# Patient Record
Sex: FEMALE | Race: WHITE
[De-identification: names, ages, dates, MRNs, and addresses within clinical notes are randomized per-mention and may not be internally consistent; named-entity substitution may affect disease eponyms.]

---

## 2017-06-22 ENCOUNTER — HOSPITAL ENCOUNTER (OUTPATIENT)
Dept: HOSPITAL 76 - LAB | Age: 57
Discharge: HOME | End: 2017-06-22
Attending: PHYSICIAN ASSISTANT
Payer: COMMERCIAL

## 2017-06-22 DIAGNOSIS — R73.01: ICD-10-CM

## 2017-06-22 DIAGNOSIS — E78.5: Primary | ICD-10-CM

## 2017-06-22 LAB
ALBUMIN/GLOB SERPL: 1.6 {RATIO} (ref 1–2.2)
ANION GAP SERPL CALCULATED.4IONS-SCNC: 11 MMOL/L (ref 6–13)
BILIRUB BLD-MCNC: 1.5 MG/DL (ref 0.2–1)
BUN SERPL-MCNC: 25 MG/DL (ref 6–20)
CALCIUM UR-MCNC: 10.1 MG/DL (ref 8.5–10.3)
CHLORIDE SERPL-SCNC: 102 MMOL/L (ref 101–111)
CHOLEST SERPL-MCNC: 207 MG/DL
CO2 SERPL-SCNC: 27 MMOL/L (ref 21–32)
CREAT SERPLBLD-SCNC: 0.9 MG/DL (ref 0.4–1)
EST. AVERAGE GLUCOSE BLD GHB EST-MCNC: 157 MG/DL (ref 70–100)
GFRSERPLBLD MDRD-ARVRAT: 65 ML/MIN/{1.73_M2} (ref 89–?)
GLOBULIN SER-MCNC: 2.9 G/DL (ref 2.1–4.2)
GLUCOSE SERPL-MCNC: 147 MG/DL (ref 70–100)
HBA1C BLD-MCNC: 0.8 G/DL
HDLC SERPL-MCNC: 63 MG/DL
HDLC SERPL: 3.3 {RATIO} (ref ?–4.4)
LDLC/HDLC SERPL: 1.7 {RATIO} (ref ?–4.4)
POTASSIUM SERPL-SCNC: 4.1 MMOL/L (ref 3.5–5)
PROT SPEC-MCNC: 7.6 G/DL (ref 6.7–8.2)
SODIUM SERPLBLD-SCNC: 140 MMOL/L (ref 135–145)
TRIGL P FAST SERPL-MCNC: 180 MG/DL
VLDLC SERPL-SCNC: 36 MG/DL

## 2017-06-22 PROCEDURE — 36415 COLL VENOUS BLD VENIPUNCTURE: CPT

## 2017-06-22 PROCEDURE — 83036 HEMOGLOBIN GLYCOSYLATED A1C: CPT

## 2017-06-22 PROCEDURE — 80053 COMPREHEN METABOLIC PANEL: CPT

## 2017-06-22 PROCEDURE — 80061 LIPID PANEL: CPT

## 2017-08-04 ENCOUNTER — HOSPITAL ENCOUNTER (OUTPATIENT)
Dept: HOSPITAL 76 - DI | Age: 57
Discharge: HOME | End: 2017-08-04
Attending: PHYSICIAN ASSISTANT
Payer: COMMERCIAL

## 2017-08-04 DIAGNOSIS — R01.1: Primary | ICD-10-CM

## 2017-08-04 PROCEDURE — 93306 TTE W/DOPPLER COMPLETE: CPT

## 2017-10-23 ENCOUNTER — HOSPITAL ENCOUNTER (OUTPATIENT)
Dept: HOSPITAL 76 - LAB | Age: 57
Discharge: HOME | End: 2017-10-23
Attending: PHYSICIAN ASSISTANT
Payer: COMMERCIAL

## 2017-10-23 DIAGNOSIS — E11.9: Primary | ICD-10-CM

## 2017-10-23 LAB
ALBUMIN/GLOB SERPL: 1.5 {RATIO} (ref 1–2.2)
ANION GAP SERPL CALCULATED.4IONS-SCNC: 13 MMOL/L (ref 6–13)
BILIRUB BLD-MCNC: 1.4 MG/DL (ref 0.2–1)
BUN SERPL-MCNC: 28 MG/DL (ref 6–20)
CALCIUM UR-MCNC: 10.4 MG/DL (ref 8.5–10.3)
CHLORIDE SERPL-SCNC: 98 MMOL/L (ref 101–111)
CHOLEST SERPL-MCNC: 273 MG/DL
CO2 SERPL-SCNC: 26 MMOL/L (ref 21–32)
CREAT SERPLBLD-SCNC: 1.1 MG/DL (ref 0.4–1)
EST. AVERAGE GLUCOSE BLD GHB EST-MCNC: 143 MG/DL (ref 70–100)
GFRSERPLBLD MDRD-ARVRAT: 51 ML/MIN/{1.73_M2} (ref 89–?)
GLOBULIN SER-MCNC: 3.1 G/DL (ref 2.1–4.2)
GLUCOSE SERPL-MCNC: 133 MG/DL (ref 70–100)
HBA1C BLD-MCNC: 0.69 G/DL
HDLC SERPL-MCNC: 72 MG/DL
HDLC SERPL: 3.8 {RATIO} (ref ?–4.4)
LDLC/HDLC SERPL: 2.4 {RATIO} (ref ?–4.4)
POTASSIUM SERPL-SCNC: 4 MMOL/L (ref 3.5–5)
PROT SPEC-MCNC: 7.9 G/DL (ref 6.7–8.2)
SODIUM SERPLBLD-SCNC: 137 MMOL/L (ref 135–145)
TRIGL P FAST SERPL-MCNC: 157 MG/DL
VLDLC SERPL-SCNC: 31 MG/DL

## 2017-10-23 PROCEDURE — 83036 HEMOGLOBIN GLYCOSYLATED A1C: CPT

## 2017-10-23 PROCEDURE — 36415 COLL VENOUS BLD VENIPUNCTURE: CPT

## 2017-10-23 PROCEDURE — 80053 COMPREHEN METABOLIC PANEL: CPT

## 2017-10-23 PROCEDURE — 80061 LIPID PANEL: CPT

## 2018-01-17 ENCOUNTER — HOSPITAL ENCOUNTER (OUTPATIENT)
Dept: HOSPITAL 76 - LAB | Age: 58
Discharge: HOME | End: 2018-01-17
Attending: PHYSICIAN ASSISTANT
Payer: COMMERCIAL

## 2018-01-17 DIAGNOSIS — E11.9: Primary | ICD-10-CM

## 2018-01-17 LAB
ALBUMIN DIAFP-MCNC: 4.7 G/DL (ref 3.2–5.5)
ALBUMIN/GLOB SERPL: 1.6 {RATIO} (ref 1–2.2)
ALP SERPL-CCNC: 50 IU/L (ref 42–121)
ALT SERPL W P-5'-P-CCNC: 17 IU/L (ref 10–60)
ANION GAP SERPL CALCULATED.4IONS-SCNC: 12 MMOL/L (ref 6–13)
AST SERPL W P-5'-P-CCNC: 25 IU/L (ref 10–42)
BILIRUB BLD-MCNC: 1.6 MG/DL (ref 0.2–1)
BUN SERPL-MCNC: 24 MG/DL (ref 6–20)
CALCIUM UR-MCNC: 10.1 MG/DL (ref 8.5–10.3)
CHLORIDE SERPL-SCNC: 100 MMOL/L (ref 101–111)
CHOLEST SERPL-MCNC: 172 MG/DL
CO2 SERPL-SCNC: 25 MMOL/L (ref 21–32)
CREAT SERPLBLD-SCNC: 1.1 MG/DL (ref 0.4–1)
EST. AVERAGE GLUCOSE BLD GHB EST-MCNC: 157 MG/DL (ref 70–100)
GFRSERPLBLD MDRD-ARVRAT: 51 ML/MIN/{1.73_M2} (ref 89–?)
GLOBULIN SER-MCNC: 3 G/DL (ref 2.1–4.2)
GLUCOSE SERPL-MCNC: 154 MG/DL (ref 70–100)
HB2 TOTAL: 14.4 G/DL
HBA1C BLD-MCNC: 0.78 G/DL
HDLC SERPL-MCNC: 62 MG/DL
HDLC SERPL: 2.8 {RATIO} (ref ?–4.4)
HEMOGLOBIN A1C %: 7.1 % (ref 4.6–6.2)
LDLC SERPL CALC-MCNC: 85 MG/DL
LDLC/HDLC SERPL: 1.4 {RATIO} (ref ?–4.4)
PROT SPEC-MCNC: 7.7 G/DL (ref 6.7–8.2)
SODIUM SERPLBLD-SCNC: 137 MMOL/L (ref 135–145)
VLDLC SERPL-SCNC: 25 MG/DL

## 2018-01-17 PROCEDURE — 36415 COLL VENOUS BLD VENIPUNCTURE: CPT

## 2018-01-17 PROCEDURE — 80053 COMPREHEN METABOLIC PANEL: CPT

## 2018-01-17 PROCEDURE — 83036 HEMOGLOBIN GLYCOSYLATED A1C: CPT

## 2018-01-17 PROCEDURE — 83721 ASSAY OF BLOOD LIPOPROTEIN: CPT

## 2018-01-17 PROCEDURE — 80061 LIPID PANEL: CPT

## 2018-04-26 ENCOUNTER — HOSPITAL ENCOUNTER (OUTPATIENT)
Dept: HOSPITAL 76 - LAB | Age: 58
Discharge: HOME | End: 2018-04-26
Attending: PHYSICIAN ASSISTANT
Payer: COMMERCIAL

## 2018-04-26 DIAGNOSIS — E11.9: Primary | ICD-10-CM

## 2018-04-26 LAB
ANION GAP SERPL CALCULATED.4IONS-SCNC: 9 MMOL/L (ref 6–13)
BUN SERPL-MCNC: 27 MG/DL (ref 6–20)
CALCIUM UR-MCNC: 10.2 MG/DL (ref 8.5–10.3)
CHLORIDE SERPL-SCNC: 101 MMOL/L (ref 101–111)
CO2 SERPL-SCNC: 26 MMOL/L (ref 21–32)
CREAT SERPLBLD-SCNC: 1 MG/DL (ref 0.4–1)
EST. AVERAGE GLUCOSE BLD GHB EST-MCNC: 217 MG/DL (ref 70–100)
GFRSERPLBLD MDRD-ARVRAT: 57 ML/MIN/{1.73_M2} (ref 89–?)
GLUCOSE SERPL-MCNC: 178 MG/DL (ref 70–100)
HB2 TOTAL: 14.8 G/DL
HBA1C BLD-MCNC: 1.14 G/DL
HEMOGLOBIN A1C %: 9.2 % (ref 4.6–6.2)
SODIUM SERPLBLD-SCNC: 136 MMOL/L (ref 135–145)

## 2018-04-26 PROCEDURE — 36415 COLL VENOUS BLD VENIPUNCTURE: CPT

## 2018-04-26 PROCEDURE — 80048 BASIC METABOLIC PNL TOTAL CA: CPT

## 2018-04-26 PROCEDURE — 83036 HEMOGLOBIN GLYCOSYLATED A1C: CPT

## 2018-09-19 ENCOUNTER — HOSPITAL ENCOUNTER (OUTPATIENT)
Dept: HOSPITAL 76 - LAB | Age: 58
Discharge: HOME | End: 2018-09-19
Attending: PHYSICIAN ASSISTANT
Payer: COMMERCIAL

## 2018-09-19 DIAGNOSIS — E11.9: Primary | ICD-10-CM

## 2018-09-19 LAB
ALBUMIN DIAFP-MCNC: 4.5 G/DL (ref 3.2–5.5)
ALBUMIN/GLOB SERPL: 1.4 {RATIO} (ref 1–2.2)
ALP SERPL-CCNC: 65 IU/L (ref 42–121)
ALT SERPL W P-5'-P-CCNC: 17 IU/L (ref 10–60)
ANION GAP SERPL CALCULATED.4IONS-SCNC: 9 MMOL/L (ref 6–13)
AST SERPL W P-5'-P-CCNC: 23 IU/L (ref 10–42)
BILIRUB BLD-MCNC: 1.7 MG/DL (ref 0.2–1)
BUN SERPL-MCNC: 27 MG/DL (ref 6–20)
CALCIUM UR-MCNC: 10.1 MG/DL (ref 8.5–10.3)
CHLORIDE SERPL-SCNC: 101 MMOL/L (ref 101–111)
CHOLEST SERPL-MCNC: 217 MG/DL
CO2 SERPL-SCNC: 28 MMOL/L (ref 21–32)
CREAT SERPLBLD-SCNC: 1 MG/DL (ref 0.4–1)
EST. AVERAGE GLUCOSE BLD GHB EST-MCNC: 166 MG/DL (ref 70–100)
GFRSERPLBLD MDRD-ARVRAT: 57 ML/MIN/{1.73_M2} (ref 89–?)
GLOBULIN SER-MCNC: 3.3 G/DL (ref 2.1–4.2)
GLUCOSE SERPL-MCNC: 154 MG/DL (ref 70–100)
HB2 TOTAL: 14.9 G/DL
HBA1C BLD-MCNC: 0.86 G/DL
HDLC SERPL-MCNC: 77 MG/DL
HDLC SERPL: 2.8 {RATIO} (ref ?–4.4)
HEMOGLOBIN A1C %: 7.4 % (ref 4.6–6.2)
LDLC SERPL CALC-MCNC: 110 MG/DL
LDLC/HDLC SERPL: 1.4 {RATIO} (ref ?–4.4)
PROT SPEC-MCNC: 7.8 G/DL (ref 6.7–8.2)
SODIUM SERPLBLD-SCNC: 138 MMOL/L (ref 135–145)
VLDLC SERPL-SCNC: 30 MG/DL

## 2018-09-19 PROCEDURE — 80053 COMPREHEN METABOLIC PANEL: CPT

## 2018-09-19 PROCEDURE — 83721 ASSAY OF BLOOD LIPOPROTEIN: CPT

## 2018-09-19 PROCEDURE — 36415 COLL VENOUS BLD VENIPUNCTURE: CPT

## 2018-09-19 PROCEDURE — 80061 LIPID PANEL: CPT

## 2018-09-19 PROCEDURE — 83036 HEMOGLOBIN GLYCOSYLATED A1C: CPT

## 2018-12-17 ENCOUNTER — HOSPITAL ENCOUNTER (OUTPATIENT)
Dept: HOSPITAL 76 - LAB | Age: 58
Discharge: HOME | End: 2018-12-17
Attending: PHYSICIAN ASSISTANT
Payer: COMMERCIAL

## 2018-12-17 DIAGNOSIS — E11.9: Primary | ICD-10-CM

## 2018-12-17 LAB
ANION GAP SERPL CALCULATED.4IONS-SCNC: 11 MMOL/L (ref 6–13)
BUN SERPL-MCNC: 17 MG/DL (ref 6–20)
CALCIUM UR-MCNC: 9.9 MG/DL (ref 8.5–10.3)
CHLORIDE SERPL-SCNC: 96 MMOL/L (ref 101–111)
CHOLEST SERPL-MCNC: 181 MG/DL
CO2 SERPL-SCNC: 26 MMOL/L (ref 21–32)
CREAT SERPLBLD-SCNC: 1 MG/DL (ref 0.4–1)
EST. AVERAGE GLUCOSE BLD GHB EST-MCNC: 137 MG/DL (ref 70–100)
GFRSERPLBLD MDRD-ARVRAT: 57 ML/MIN/{1.73_M2} (ref 89–?)
GLUCOSE SERPL-MCNC: 163 MG/DL (ref 70–100)
HB2 TOTAL: 13 G/DL
HBA1C BLD-MCNC: 0.6 G/DL
HDLC SERPL-MCNC: 70 MG/DL
HDLC SERPL: 2.6 {RATIO} (ref ?–4.4)
HEMOGLOBIN A1C %: 6.4 % (ref 4.6–6.2)
LDLC SERPL CALC-MCNC: 90 MG/DL
LDLC/HDLC SERPL: 1.3 {RATIO} (ref ?–4.4)
SODIUM SERPLBLD-SCNC: 133 MMOL/L (ref 135–145)
VLDLC SERPL-SCNC: 21 MG/DL

## 2018-12-17 PROCEDURE — 36415 COLL VENOUS BLD VENIPUNCTURE: CPT

## 2018-12-17 PROCEDURE — 83721 ASSAY OF BLOOD LIPOPROTEIN: CPT

## 2018-12-17 PROCEDURE — 83036 HEMOGLOBIN GLYCOSYLATED A1C: CPT

## 2018-12-17 PROCEDURE — 80061 LIPID PANEL: CPT

## 2018-12-17 PROCEDURE — 80048 BASIC METABOLIC PNL TOTAL CA: CPT

## 2019-04-29 ENCOUNTER — HOSPITAL ENCOUNTER (OUTPATIENT)
Dept: HOSPITAL 76 - LAB | Age: 59
Discharge: HOME | End: 2019-04-29
Attending: PHYSICIAN ASSISTANT
Payer: COMMERCIAL

## 2019-04-29 DIAGNOSIS — E11.9: Primary | ICD-10-CM

## 2019-04-29 DIAGNOSIS — D05.12: ICD-10-CM

## 2019-04-29 DIAGNOSIS — E78.5: ICD-10-CM

## 2019-04-29 LAB
ALBUMIN DIAFP-MCNC: 4.3 G/DL (ref 3.2–5.5)
ALBUMIN/GLOB SERPL: 1.2 {RATIO} (ref 1–2.2)
ALP SERPL-CCNC: 59 IU/L (ref 42–121)
ALT SERPL W P-5'-P-CCNC: 21 IU/L (ref 10–60)
ANION GAP SERPL CALCULATED.4IONS-SCNC: 13 MMOL/L (ref 6–13)
AST SERPL W P-5'-P-CCNC: 25 IU/L (ref 10–42)
BASOPHILS NFR BLD AUTO: 0 10^3/UL (ref 0–0.1)
BASOPHILS NFR BLD AUTO: 1 %
BILIRUB BLD-MCNC: 1.2 MG/DL (ref 0.2–1)
BUN SERPL-MCNC: 22 MG/DL (ref 6–20)
CALCIUM UR-MCNC: 10 MG/DL (ref 8.5–10.3)
CHLORIDE SERPL-SCNC: 95 MMOL/L (ref 101–111)
CHOLEST SERPL-MCNC: 154 MG/DL
CO2 SERPL-SCNC: 26 MMOL/L (ref 21–32)
CREAT SERPLBLD-SCNC: 1 MG/DL (ref 0.4–1)
CREAT UR-SCNC: 90.2 MG/DL
EOSINOPHIL # BLD AUTO: 0.1 10^3/UL (ref 0–0.7)
EOSINOPHIL NFR BLD AUTO: 2 %
ERYTHROCYTE [DISTWIDTH] IN BLOOD BY AUTOMATED COUNT: 13.1 % (ref 12–15)
EST. AVERAGE GLUCOSE BLD GHB EST-MCNC: 143 MG/DL (ref 70–100)
GFRSERPLBLD MDRD-ARVRAT: 57 ML/MIN/{1.73_M2} (ref 89–?)
GLOBULIN SER-MCNC: 3.6 G/DL (ref 2.1–4.2)
GLUCOSE SERPL-MCNC: 140 MG/DL (ref 70–100)
HB2 TOTAL: 14.4 G/DL
HBA1C BLD-MCNC: 0.7 G/DL
HDLC SERPL-MCNC: 53 MG/DL
HDLC SERPL: 2.9 {RATIO} (ref ?–4.4)
HEMOGLOBIN A1C %: 6.6 % (ref 4.6–6.2)
HGB UR QL STRIP: 13 G/DL (ref 12–16)
LDLC SERPL CALC-MCNC: 87 MG/DL
LDLC/HDLC SERPL: 1.6 {RATIO} (ref ?–4.4)
LYMPHOCYTES # SPEC AUTO: 0.9 10^3/UL (ref 1.5–3.5)
LYMPHOCYTES NFR BLD AUTO: 17.6 %
MCH RBC QN AUTO: 29.6 PG (ref 27–31)
MCHC RBC AUTO-ENTMCNC: 32.9 G/DL (ref 32–36)
MCV RBC AUTO: 89.9 FL (ref 81–99)
MICROALBUMIN UR-MCNC: 7.7 MG/DL (ref 0–300)
MICROALBUMIN/CREAT RATIO PNL UR: 85.4 UG/MG (ref ?–30)
MONOCYTES # BLD AUTO: 0.5 10^3/UL (ref 0–1)
MONOCYTES NFR BLD AUTO: 9.2 %
NEUTROPHILS # BLD AUTO: 3.5 10^3/UL (ref 1.5–6.6)
NEUTROPHILS # SNV AUTO: 4.9 X10^3/UL (ref 4.8–10.8)
NEUTROPHILS NFR BLD AUTO: 70.2 %
PDW BLD AUTO: 8.5 FL (ref 7.9–10.8)
PLATELET # BLD: 207 10^3/UL (ref 130–450)
PROT SPEC-MCNC: 7.9 G/DL (ref 6.7–8.2)
RBC MAR: 4.41 10^6/UL (ref 4.2–5.4)
SODIUM SERPLBLD-SCNC: 134 MMOL/L (ref 135–145)
VLDLC SERPL-SCNC: 14 MG/DL

## 2019-04-29 PROCEDURE — 82043 UR ALBUMIN QUANTITATIVE: CPT

## 2019-04-29 PROCEDURE — 36415 COLL VENOUS BLD VENIPUNCTURE: CPT

## 2019-04-29 PROCEDURE — 80053 COMPREHEN METABOLIC PANEL: CPT

## 2019-04-29 PROCEDURE — 84443 ASSAY THYROID STIM HORMONE: CPT

## 2019-04-29 PROCEDURE — 83036 HEMOGLOBIN GLYCOSYLATED A1C: CPT

## 2019-04-29 PROCEDURE — 83721 ASSAY OF BLOOD LIPOPROTEIN: CPT

## 2019-04-29 PROCEDURE — 85025 COMPLETE CBC W/AUTO DIFF WBC: CPT

## 2019-04-29 PROCEDURE — 80061 LIPID PANEL: CPT

## 2019-04-29 PROCEDURE — 82570 ASSAY OF URINE CREATININE: CPT

## 2019-08-07 ENCOUNTER — HOSPITAL ENCOUNTER (OUTPATIENT)
Dept: HOSPITAL 76 - DI.WCP | Age: 59
Discharge: HOME | End: 2019-08-07
Attending: PHYSICIAN ASSISTANT
Payer: COMMERCIAL

## 2019-08-07 DIAGNOSIS — R05: Primary | ICD-10-CM

## 2019-08-07 PROCEDURE — 71046 X-RAY EXAM CHEST 2 VIEWS: CPT

## 2019-08-08 NOTE — XRAY REPORT
Reason:  CHRONIC COUGH

Procedure Date:  08/07/2019   

Accession Number:  [id number] / [id number]                    

Procedure:  WCP - Chest 2 View X-Ray CPT Code:  27617

 

FULL RESULT:

 

 

EXAM:

CHEST RADIOGRAPHY

 

EXAM DATE: 8/7/2019 03:23 PM.

 

CLINICAL HISTORY: CHRONIC COUGH.

 

COMPARISON: CHEST 2 VIEW PA/LAT 07/17/2013 9:37 PM.

 

TECHNIQUE: 2 views.

 

FINDINGS:

Lungs/Pleura: No localized infiltrate, consolidation, effusion, or 

pneumothorax. Small calcified adenomata.

 

Mediastinum: Normal heart size, unchanged. Upper lobe vessels not 

distended. Calcified mediastinal lymph nodes.

 

Other: Postoperative changes.

IMPRESSION: No acute disease.

 

[facility]

## 2019-10-30 ENCOUNTER — HOSPITAL ENCOUNTER (OUTPATIENT)
Dept: HOSPITAL 76 - LAB | Age: 59
Discharge: HOME | End: 2019-10-30
Attending: PHYSICIAN ASSISTANT
Payer: COMMERCIAL

## 2019-10-30 DIAGNOSIS — E11.9: Primary | ICD-10-CM

## 2019-10-30 LAB
ANION GAP SERPL CALCULATED.4IONS-SCNC: 7 MMOL/L (ref 6–13)
BUN SERPL-MCNC: 30 MG/DL (ref 6–20)
CALCIUM UR-MCNC: 9.6 MG/DL (ref 8.5–10.3)
CHLORIDE SERPL-SCNC: 101 MMOL/L (ref 101–111)
CO2 SERPL-SCNC: 27 MMOL/L (ref 21–32)
CREAT SERPLBLD-SCNC: 1.5 MG/DL (ref 0.4–1)
EST. AVERAGE GLUCOSE BLD GHB EST-MCNC: 157 MG/DL (ref 70–100)
GFRSERPLBLD MDRD-ARVRAT: 36 ML/MIN/{1.73_M2} (ref 89–?)
GLUCOSE SERPL-MCNC: 132 MG/DL (ref 70–100)
HB2 TOTAL: 11.9 G/DL
HBA1C BLD-MCNC: 0.65 G/DL
HEMOGLOBIN A1C %: 7.1 % (ref 4.6–6.2)
SODIUM SERPLBLD-SCNC: 135 MMOL/L (ref 135–145)

## 2019-10-30 PROCEDURE — 36415 COLL VENOUS BLD VENIPUNCTURE: CPT

## 2019-10-30 PROCEDURE — 80048 BASIC METABOLIC PNL TOTAL CA: CPT

## 2019-10-30 PROCEDURE — 83036 HEMOGLOBIN GLYCOSYLATED A1C: CPT

## 2020-03-11 ENCOUNTER — HOSPITAL ENCOUNTER (OUTPATIENT)
Dept: HOSPITAL 76 - LAB | Age: 60
Discharge: HOME | End: 2020-03-11
Attending: INTERNAL MEDICINE
Payer: COMMERCIAL

## 2020-03-11 DIAGNOSIS — D47.2: ICD-10-CM

## 2020-03-11 DIAGNOSIS — B19.10: ICD-10-CM

## 2020-03-11 DIAGNOSIS — L93.2: Primary | ICD-10-CM

## 2020-03-11 DIAGNOSIS — N00.9: ICD-10-CM

## 2020-03-11 DIAGNOSIS — B17.10: ICD-10-CM

## 2020-03-11 DIAGNOSIS — I77.6: ICD-10-CM

## 2020-03-11 PROCEDURE — 86160 COMPLEMENT ANTIGEN: CPT

## 2020-03-11 PROCEDURE — 87340 HEPATITIS B SURFACE AG IA: CPT

## 2020-03-11 PROCEDURE — 86021 WBC ANTIBODY IDENTIFICATION: CPT

## 2020-03-11 PROCEDURE — 36415 COLL VENOUS BLD VENIPUNCTURE: CPT

## 2020-03-11 PROCEDURE — 83520 IMMUNOASSAY QUANT NOS NONAB: CPT

## 2020-03-11 PROCEDURE — 81599 UNLISTED MAAA: CPT

## 2020-03-11 PROCEDURE — 84165 PROTEIN E-PHORESIS SERUM: CPT

## 2020-03-11 PROCEDURE — 86334 IMMUNOFIX E-PHORESIS SERUM: CPT

## 2020-03-11 PROCEDURE — 84155 ASSAY OF PROTEIN SERUM: CPT

## 2020-03-11 PROCEDURE — 86225 DNA ANTIBODY NATIVE: CPT

## 2020-03-11 PROCEDURE — 86038 ANTINUCLEAR ANTIBODIES: CPT

## 2020-03-11 PROCEDURE — 86803 HEPATITIS C AB TEST: CPT

## 2020-03-13 LAB
HEPATITIS B SURFACE ANTIGEN: (no result)
HEPATITIS C ANTIBODY: (no result)
SIGNAL TO CUT-OFF: 0 (ref ?–1)

## 2020-03-14 LAB
ALBUMIN SERPL-MCNC: 3.9 G/DL (ref 3.8–4.8)
ALPHA1 GLOB SERPL ELPH-MCNC: 0.5 G/DL (ref 0.2–0.3)
ALPHA2 GLOB SERPL ELPH-MCNC: 1.1 G/DL (ref 0.5–0.9)
ANA SER QL: NEGATIVE
BETA1 GLOB FLD ELPH-MCNC: 0.5 G/DL (ref 0.4–0.6)
BETA2 GLOB FLD ELPH-MCNC: 0.4 G/DL (ref 0.2–0.5)
GAMMA GLOB SERPL ELPH-MCNC: 1.4 G/DL (ref 0.8–1.7)
IMP & REVIEW OF LAB RESULTS: (no result)

## 2020-03-15 ENCOUNTER — HOSPITAL ENCOUNTER (OUTPATIENT)
Dept: HOSPITAL 76 - DI | Age: 60
Discharge: HOME | End: 2020-03-15
Attending: INTERNAL MEDICINE
Payer: COMMERCIAL

## 2020-03-15 DIAGNOSIS — R18.8: ICD-10-CM

## 2020-03-15 DIAGNOSIS — N13.30: Primary | ICD-10-CM

## 2020-03-15 PROCEDURE — 93975 VASCULAR STUDY: CPT

## 2020-03-15 PROCEDURE — 76770 US EXAM ABDO BACK WALL COMP: CPT

## 2020-03-16 LAB — BM IGG SER IA-ACNC: <1 AI (ref ?–1)

## 2020-03-16 NOTE — ULTRASOUND REPORT
Reason:  ACUTE KIDNEY INJURY

Procedure Date:  03/15/2020   

Accession Number:  [id number] / [id number]                    

Procedure:  US  - Retroperitoneal CPT Code:  

 

***Final Report***

 

 

FULL RESULT:

 

 

EXAM:

RENAL ULTRASOUND

 

EXAM DATE: 3/15/2020 10:45 AM.

 

CLINICAL HISTORY: Acute kidney injury.

 

COMPARISON: CHEST W/ 02/27/2014 11:21 AM.

 

TECHNIQUE: Real-time scanning was performed with static images obtained.

 

FINDINGS:

Right Kidney: 12.2 x 5.3 x 8.2 cm. There is severe right-sided 

hydronephrosis, without obstructing calculus. Evidence of cortical 

thinning.

 

Left Kidney: 9.6 x 5.0 x 3.4 cm. There is moderate to severe left 

hydronephrosis.

 

Bladder: Bilateral urinary bladder jets were seen, with slow flow on the 

right. The prevoid bladder volume was 49 cc. The postvoid bladder volume 

was less than 1 cc.

 

Other: Moderate volume ascites bilaterally in the upper abdomen.

IMPRESSION: Severe right and moderate to severe left hydronephrosis. Weak 

right urinary bladder jets. Moderate ascites in the upper abdomen. No 

obstructing nephroliths identified. Etiology of the bilateral 

hydronephrosis not identified on current study. Etiology of bilateral 

hydronephrosis not identified on current study. Follow-up CT may be 

helpful for further evaluation.

 

[facility]

## 2020-03-17 LAB
C3 SERPL-MCNC: 163 MG/DL (ref 83–193)
COMPLEMENT C4C SERPL-MCNC: 39 MG/DL (ref 15–57)
DSDNA AB SER-ACNC: <1 IU/ML

## 2020-03-20 ENCOUNTER — HOSPITAL ENCOUNTER (OUTPATIENT)
Dept: HOSPITAL 76 - DI | Age: 60
Discharge: HOME | End: 2020-03-20
Attending: PHYSICIAN ASSISTANT
Payer: COMMERCIAL

## 2020-03-20 DIAGNOSIS — M89.9: ICD-10-CM

## 2020-03-20 DIAGNOSIS — J90: ICD-10-CM

## 2020-03-20 DIAGNOSIS — N13.30: ICD-10-CM

## 2020-03-20 DIAGNOSIS — Z90.49: ICD-10-CM

## 2020-03-20 DIAGNOSIS — R19.00: Primary | ICD-10-CM

## 2020-03-20 PROCEDURE — 74176 CT ABD & PELVIS W/O CONTRAST: CPT

## 2020-03-21 NOTE — CT REPORT
Reason:  HYDRONEPHROSIS

Procedure Date:  03/20/2020   

Accession Number:  [id number] / [id number]                    

Procedure:  CT  - Abdomen/Pelvis WO CPT Code:  

 

***Addended Final Report***

 

 

FULL RESULT:

 

 

EXAM:

CT ABDOMEN AND PELVIS (CT KUB)

 

EXAM DATE: 3/20/2020 02:16 PM.

 

CLINICAL HISTORY: Hydronephrosis.

 

COMPARISONS:

MRI ABDOMEN W/O CONT 01/14/2012 8:18 AM.

 

TECHNIQUE: Routine axial helical CT imaging was performed through the 

abdomen and pelvis without IV contrast. Reconstructions: Coronal and 

sagittal.

 

In accordance with CT protocol optimization, one or more of the following 

dose reduction techniques were utilized for this exam: automated exposure 

control, adjustment of mA and/or KV based on patient size, or use of 

iterative reconstructive technique.

 

FINDINGS:

Lung Bases: Small left pleural effusion with adjacent atelectasis. Small 

left lung base granuloma. Otherwise no acute findings in the visualized 

chest.

 

Right Kidney/Ureter: Severe right hydroureteronephrosis with renal pelvis 

measuring 4 cm transverse. There is hydroureter to level of pelvis where 

large mass is seen which is likely the cause of obstruction. No urinary 

calculi. No perinephric fat stranding. There is cortical thinning of 

right kidney suggesting chronic obstructive process.

 

Left Kidney/Ureter: Moderate left hydroureteronephrosis to level of 

pelvic mass without evidence of ureteral calculus.

 

Other Solid Organs: Liver, spleen and adrenal glands are unremarkable on 

noncontrast CT. Pancreas is atrophic with coarse calcifications.

 

Gallbladder/Bile Ducts: Cholecystectomy. No significant biliary dilation.

 

Peritoneal Cavity: Moderate ascites. Peritoneal thickening along the 

anterior pelvis suspected. No free air. No bowel obstruction. Appendix 

not seen. There is a large pelvic mass as described below. No significant 

adenopathy seen although evaluation is limited on noncontrast CT.

 

Pelvic Organs: Bladder is decompressed which limits evaluation. Lobulated 

mass or masses are present within the pelvis collectively measuring 11.6 

x 9.5 x 10.0 cm. Masses appear to abut and displace uterus and bladder 

anteroinferiorly.

 

Vasculature: Mild atherosclerosis. No abdominal aortic aneurysm.

 

Other: Diffuse heterogeneity of the bones suspicious for widespread 

osseous metastatic disease. No fracture.

 

IMPRESSION:

1. Large pelvic mass or masses collectively measuring 11.6 x 9.5 x 10.0 

cm. Findings are suspicious for neoplasm which is likely of ovarian 

origin. Recommend contrast-enhanced CT or MR imaging to further assess.

2. Severe right and moderate left hydroureteronephrosis to level of 

aforementioned pelvic mass. No urinary calculi.

3. Moderate ascites with suspected peritoneal thickening in the pelvis. 

Malignant ascites is not excluded.

4. Diffuse heterogeneity of bone suspicious for widespread osseous 

metastatic disease.

5. Small left pleural effusion.

6. Sequela of chronic pancreatitis.

7. Cholecystectomy.

 

[facility]IA

 

The call report notification system was initiated by Dr. Lara Hartman 

at 10:18 PM on 3/21/2020.

ADDENDUM: 03/22/20 08:45

 

The above call report findings  were discussed with Dr. Collins by Dr. Rui Rose at 08:45 AM on 3/22/2020.

## 2020-03-31 ENCOUNTER — HOSPITAL ENCOUNTER (OUTPATIENT)
Dept: HOSPITAL 76 - LAB | Age: 60
Discharge: HOME | End: 2020-03-31
Attending: OBSTETRICS & GYNECOLOGY
Payer: COMMERCIAL

## 2020-03-31 DIAGNOSIS — R19.00: Primary | ICD-10-CM

## 2020-03-31 LAB
INR PPP: 1.2 (ref 0.8–1.2)
PROTHROM ACT/NOR PPP: 13.6 SECS (ref 9.9–12.6)

## 2020-03-31 PROCEDURE — 36415 COLL VENOUS BLD VENIPUNCTURE: CPT

## 2020-03-31 PROCEDURE — 85610 PROTHROMBIN TIME: CPT

## 2020-05-11 ENCOUNTER — HOSPITAL ENCOUNTER (OUTPATIENT)
Dept: HOSPITAL 76 - SDS | Age: 60
Discharge: HOME | End: 2020-05-11
Attending: SURGERY
Payer: COMMERCIAL

## 2020-05-11 VITALS — SYSTOLIC BLOOD PRESSURE: 112 MMHG | DIASTOLIC BLOOD PRESSURE: 83 MMHG

## 2020-05-11 DIAGNOSIS — E11.9: ICD-10-CM

## 2020-05-11 DIAGNOSIS — E78.00: ICD-10-CM

## 2020-05-11 DIAGNOSIS — J45.909: ICD-10-CM

## 2020-05-11 DIAGNOSIS — C50.919: Primary | ICD-10-CM

## 2020-05-11 DIAGNOSIS — I10: ICD-10-CM

## 2020-05-11 PROCEDURE — 71045 X-RAY EXAM CHEST 1 VIEW: CPT

## 2020-05-11 PROCEDURE — 36561 INSERT TUNNELED CV CATH: CPT

## 2020-05-11 NOTE — XRAY REPORT
Reason:  Left port

Procedure Date:  05/11/2020   

Accession Number:  [id number] / [id number]                    

Procedure:  XR  - Post Port Placement 1V CXR CPT Code:  03262

 

***Final Report***

 

 

FULL RESULT:

 

 

EXAM:

CHEST RADIOGRAPHY

 

EXAM DATE: 05/11/2020 08:22 AM.

 

CLINICAL HISTORY: Left port.

 

COMPARISON: CHEST 2 VIEW 08/07/2019 3:07 PM.

 

TECHNIQUE: 1 view.

 

FINDINGS:

Lungs/Pleura: Shallow lung volumes without focal consolidations. No 

pneumothorax identified. Surgical clips left axilla.

 

Mediastinum: Within exam limitations, the cardiomediastinal contour is 

normal.

 

Other: Interval placement of Groshong port left chest, with distal 

catheter tip at the SVC/right atrial junction. Minimally imaged catheter 

in the right abdomen.

IMPRESSION: Interval placement of port left chest, with no pneumothorax 

identified.

 

[facility]

## 2020-05-11 NOTE — XRAY REPORT
Reason:  PORT PLACEMENT

Procedure Date:  05/11/2020   

Accession Number:  [id number] / [id number]                    

Procedure:  FL  - OR Port-A-Cath CPT Code:  

 

***Final Report***

 

 

FULL RESULT:

 

 

EXAM:

FLUOROSCOPIC GUIDANCE

 

EXAM DATE: 5/11/2020 08:08 AM.

 

CLINICAL HISTORY: PORT PLACEMENT.

 

COMPARISON: None.

 

FINDINGS: 1 image saved, fluoroscopy less than 1 minute

IMPRESSION: Fluoroscopic guidance provided for port placement. Total 

fluoroscopy time: Less than 1 minute.  Number of images: 1.

 

[facility]

## 2020-05-11 NOTE — OPERATIVE REPORT
Operative Report





- General


Procedure Date: 05/11/20


Planned Procedure: 





Left Subclavian Power Port


Pre-Op Diagnosis: Recurrent Breast Cancer


Procedure Performed: 





Left Subclavian Power Port


Post Op Diagnosis: Recurrent Breast Cancer





- Procedure Note


Primary Surgeon: Julianne


Anesthesia Provider: CATINA Baig


Anesthesia Technique: Local, MAC


Estimated Blood Loss (mL): 5


Indications: 





Facilitation of chemotherapy


Findings: 





Power port in good position in the superior vena cava


Complications: 





None apparent





- Other


Other Information/Narrative: 





After obtaining informed consent, the patient is brought to the operating room 

and placed in supine position on the operating table.  Following successful 

induction of sedation with monitored anesthesia care and appropriate padding of 

all bony prominences, the left chest and neck were prepped and draped in the 

standard surgical fashion.  A timeout was held per scope protocol.  All elements

of the surgical safety checklist were followed before, during, and after the 

procedure.


Following infiltration with local anesthetic to create a field block, the left 

subclavian vein was accessed in the deltopectoral groove.  The J-wire was gently

placed into the vein.  Fluoroscopy was used to confirm the position of the wire 

and in the subclavian vein.  We anesthetized the existing healed scar in the 

area around it for placement of the port itself.  An incision was created here 

and carried down through the skin and subcutaneous tissue.  A pocket was created

with blunt dissection.  The port tubing was attached to the tunneling device and

passed from the access site of the vein into the pocket.  It was trimmed to an 

appropriate length and the port attached.  The port was sewn into place in the 

pocket.  The dilator and introducer were then passed over the J-wire that was in

the subclavian vein.  The J-wire and dilator were removed leaving only the 

introducer.  The tubing was then passed through the introducer and the 

introducer cracked and removed per 's directions.  The port was then

checked for function and flushed and elizabeth easily.  Additional local anesthetic 

was applied to the chest wall.  The port pocket was closed with interrupted 

Vicryl sutures and Monocryl stitches were placed in both skin incision sites.  

All sponge, needle, and instrument counts were correct at the conclusion of the 

case.  Chest x-ray in the postanesthesia care unit revealed the port in good 

position in the superior vena cava without evidence of pneumothorax.

## 2020-05-11 NOTE — ANESTHESIA
Pre-Anesthesia VS, & Labs





- Diagnosis





recurrent breast cancer





- Procedure





portacath placement


Vital Signs: 





                                        











Temp Pulse Resp BP Pulse Ox


 


 36.5 C   92   16   108/84 H  100 


 


 05/11/20 06:46  05/11/20 06:46  05/11/20 06:46  05/11/20 06:46  05/11/20 06:46














                                        





Height                           5 ft 4 in


Weight (kg)                      49.6 kg


Body Mass Index                  18.8











- Pregnancy


Is Patient Pregnant?: No





- Lab Results


Lab results reviewed: Yes





Home Medications and Allergies





                                        





Felodipine [Plendil] 10 mg PO DAILY 05/07/13 


Gabapentin 300 mg PO DAILY 05/17/16 


Glipizide [Glipizide Xl] 2.5 mg PO BID 05/06/20 








Allergies/Adverse Reactions: 


                                    Allergies











Allergy/AdvReac Type Severity Reaction Status Date / Time


 


No Known Drug Allergies Allergy   Verified 05/06/20 14:59














Anes History & Medical History





- Anesthetic History


Anesthesia Complications: reports: Post-Operative Nausea/Vomiting (denies motion

sickness)


Family history of Anesthesia Complications: Denies


Family history of Malignant Hyperthermia: Denies (deep respirations impeded by 

abdominal ascites. Pt denies SOB or dyspnea)





- Medical History


Cardiovascular: reports: Hypertension


Pulmonary: reports: None


Gastrointestinal: reports: None, Other (abdominal ascites)


Urinary: reports: None, Other


Musculoskeletal: reports: None, Fatigue


Endocrine/Autoimmune: reports: Type 2 diabetes


Blood Disorders: reports: Anemia


Skin: reports: None


Smoking Status: Never smoker





- Surgical History


General: Cholecystectomy


Gynecologic: Dilation and currettage, Mastectomy


Orthopedic: Other





Exam


General: Alert, Oriented x3, Cooperative


Dental: WNL


Mouth Opening: Greater than 4 Fingerbreadths


Neck Mobility: Normal


Mallampati classification: I


Thyromental Distance: 4-6 cm


Respiratory: Lungs clear


Cardiovascular: Regular rate


Abdomen: Other (distended with ascites)


Mental/Cognitive Status: Alert/Oriented X3


Cognitive Status: Within normal limits





Plan


Anesthesia Type: MAC (Patient prefers to minimize sedation. Requests dosing only

if she becomes uncomfortable.)


Consent for Procedure(s) Verified and Reviewed: Yes


Code Status: Attempt Resuscitation


ASA classification: 2-Mild systemic disease


Is this case an emergency?: No

## 2020-06-08 ENCOUNTER — HOSPITAL ENCOUNTER (OUTPATIENT)
Dept: HOSPITAL 76 - LAB | Age: 60
Discharge: HOME | End: 2020-06-08
Attending: FAMILY MEDICINE
Payer: COMMERCIAL

## 2020-06-08 DIAGNOSIS — C78.6: Primary | ICD-10-CM

## 2020-06-08 DIAGNOSIS — R18.0: ICD-10-CM

## 2020-06-08 DIAGNOSIS — C79.51: ICD-10-CM

## 2020-06-08 LAB
APTT PPP: 38.9 SECS (ref 24.9–33.3)
INR PPP: 1.2 (ref 0.8–1.2)
PROTHROM ACT/NOR PPP: 13.8 SECS (ref 9.9–12.6)

## 2020-06-08 PROCEDURE — 85610 PROTHROMBIN TIME: CPT

## 2020-06-08 PROCEDURE — 36415 COLL VENOUS BLD VENIPUNCTURE: CPT

## 2020-06-08 PROCEDURE — 76705 ECHO EXAM OF ABDOMEN: CPT

## 2020-06-08 PROCEDURE — 49083 ABD PARACENTESIS W/IMAGING: CPT

## 2020-06-08 PROCEDURE — 85730 THROMBOPLASTIN TIME PARTIAL: CPT

## 2020-06-08 NOTE — OPERATIVE REPORT
Operative Report





- General


Procedure Date: 06/08/20


Planned Procedure: 


Large volume paracentesis


Pre-Op Diagnosis: Malignant ascites


Procedure Performed: 





Large volume paracentesis


Post Op Diagnosis: Same





- Procedure Note


Primary Surgeon: Julianne


Anesthesia Technique: Local


Estimated Blood Loss (mL): 1


Findings: 





4300 mls of straw colored peritoneal fluid


Complications: 





None apparent





- Other


Other Information/Narrative: 





After obtaining informed consent, the patient was brought to the ultrasound 

suite and placed in the supine position on the examination table.  Verbal and 

written consent were obtained for the procedure.  The ultrasound was used to 

identify a clear window of opportunity in the left lower quadrant for placement 

of a paracentesis catheter.  This area was marked.  Using the provided kit, the 

area over the gema was prepped and draped in the standard surgical fashion.  The

skin was anesthetized with 8 mL of 1% lidocaine.  A 2 mm nick was created in the

skin over the abdominal wall at this site.  The catheter was directed into the 

abdominal wall while aspirating to detect entrance into the peritoneal cavity.  

The catheter was then advanced without the needle and the needle was completely 

removed.  The port catheter was then hooked to vacuum bottles.  Clear to straw-

colored fluid was then aspirated from the peritoneal cavity.  4.3 L of clear 

fluid was removed.  Once there was no longer free flow of fluid, the catheter 

was then disconnected from suction, it was removed under direct vision.  The 

wound was cleaned once again and closed with Dermabond.  A pressure dressing was

applied.  All sponge, needle, and instrument counts were correct at the 

conclusion of the case.  The patient tolerated the procedure very well.  He was 

discharged with a return appointment in 1 week.

## 2020-06-09 NOTE — ULTRASOUND REPORT
Reason:  BREAST CA, ASCITES

Procedure Date:  06/08/2020   

Accession Number:  [id number] / [id number]                    

Procedure:  US  - Abdomen Limited CPT Code:  

 

***Final Report***

 

 

FULL RESULT:

 

 

PROCEDURE: Abdomen Limited

 

INDICATIONS:  BREAST CA, ASCITES

 

TECHNIQUE:

Real-time focused scanning was performed of the abdomen, with image 

documentation.

 

COMPARISON:  CT abdomen pelvis 3/20/2020

 

FINDINGS:  Moderate ascites is noted. Marking for paracentesis is noted.

 

IMPRESSION:

Moderate ascites.

 

Reviewed by: Margaret Bales MD on 6/9/2020 5:00 PM PDT

Approved by: Margaret Bales MD on 6/9/2020 5:00 PM PDT

 

 

Station ID:  [id number]

## 2020-06-10 ENCOUNTER — HOSPITAL ENCOUNTER (OUTPATIENT)
Dept: HOSPITAL 76 - LAB.R | Age: 60
Discharge: HOME | End: 2020-06-10
Attending: PHYSICIAN ASSISTANT
Payer: COMMERCIAL

## 2020-06-10 ENCOUNTER — HOSPITAL ENCOUNTER (OUTPATIENT)
Dept: HOSPITAL 76 - PC | Age: 60
Discharge: HOME | End: 2020-06-10
Attending: NURSE PRACTITIONER
Payer: COMMERCIAL

## 2020-06-10 DIAGNOSIS — Z51.5: Primary | ICD-10-CM

## 2020-06-10 DIAGNOSIS — Z85.3: ICD-10-CM

## 2020-06-10 DIAGNOSIS — Z90.13: ICD-10-CM

## 2020-06-10 DIAGNOSIS — J90: ICD-10-CM

## 2020-06-10 DIAGNOSIS — E11.9: Primary | ICD-10-CM

## 2020-06-10 DIAGNOSIS — C79.89: ICD-10-CM

## 2020-06-10 DIAGNOSIS — R18.8: ICD-10-CM

## 2020-06-10 DIAGNOSIS — C79.51: ICD-10-CM

## 2020-06-10 DIAGNOSIS — F32.9: ICD-10-CM

## 2020-06-10 LAB
CHOLEST SERPL-MCNC: 147 MG/DL
EST. AVERAGE GLUCOSE BLD GHB EST-MCNC: 140 MG/DL (ref 70–100)
HB2 TOTAL: 10 G/DL
HBA1C BLD-MCNC: 0.47 G/DL
HDLC SERPL-MCNC: 33 MG/DL
HDLC SERPL: 4.5 {RATIO} (ref ?–4.4)
HEMOGLOBIN A1C %: 6.5 % (ref 4.6–6.2)
LDLC SERPL CALC-MCNC: 82 MG/DL
LDLC/HDLC SERPL: 2.5 {RATIO} (ref ?–4.4)
VLDLC SERPL-SCNC: 32 MG/DL

## 2020-06-10 PROCEDURE — 83036 HEMOGLOBIN GLYCOSYLATED A1C: CPT

## 2020-06-10 PROCEDURE — 99204 OFFICE O/P NEW MOD 45 MIN: CPT

## 2020-06-10 PROCEDURE — 80061 LIPID PANEL: CPT

## 2020-06-10 PROCEDURE — 83721 ASSAY OF BLOOD LIPOPROTEIN: CPT

## 2020-06-10 NOTE — CONSULTATION NOTE
Palliative Care Consultation





- Referral


Referring Provider: Dr. Tani Buck


Time of Visit: 


Referral setting: [facility]


Referral Reason: Recurrent Breast Ca with mets to bone/pelvic mass/ascites





- Information Sources


Records reviewed: RN notes reviewed, Previous records reviewed


History/Review of Systems obtained from: Patient, Family ( Stephen at 

visit)


Exam limitations: No limitations





- History of Present Illness


Brief History of Present Illness: 


This is a ernesto 59-year-old woman who was doing fairly well, had a renal 

ultrasound in response to an elevated creatinine in March.  Unfortunately found 

bilateral hydronephrosis and ascites, which led to CT scan revealing a 12 cm 

pelvic mass.  She did have bilateral ureteral stenting on .  And a biopsy at

[facility], with a diagnosis of recurrent lobular CA 2020.  Patient was fairly

asymptomatic up to this point in time, then developed lower extremity edema, 

recurrent ascites, she has had her fifth paracentesis on .  She also has a 

known left pleural effusion.  In her staging, they did find metastatic disease 

to the bone, as well as the pelvic mass.  And she is designated as recurrent 

stage IV, ER/MN positive, HER-2 negative breast cancer.





Patient originally was diagnosed with history of stage III [id number] left breast 

intraductal carcinoma, ER positive, HER-2 negative.  She felt like she gave it 

all she could, with aggressive management of a bilateral mastectomy, left 

axillary node dissection and received adjuvant chemotherapy with AC/T with some 

severe neuropathy.  She also received adjuvant radiation to her left chest wall,

all adjuvant therapy was completed and 2013. She had been on Aromasin for 1 

year, no further treatment beyond this.





Patient does understand the seriousness of her illness, but also that this is 

treatable metastatic disease.  She will be receiving Doxil as a single agent, 

with the hope to control disease and decrease her need for paracentesis.  She 

has had a Port-A-Cath placed, and does understand she will be receiving 

treatment ongoing from this point forward.  She is not symptomatic at this point

in time from her bone mets.





Palliative care establishing care today, meeting with her  Jim 

patient today takes tablets rapport, evaluate symptom burden, and provide 

ongoing support and anticipatory guidance.








Medical/Surgical History





- Past Medical History


Cardiovascular: reports: Hypertension


Respiratory: reports: Shortness of breath, Other (left pleural effusion)


Neuro: reports: Peripheral neuropathy, Fainting


Endocrine/Autoimmune: reports: Type 2 diabetes


GI: reports: None, Other (ascites)


: reports: Renal insuffiency, Other (hydronephrosis)


HEENT: reports: Chronic hearing loss


Psych: reports: None


Musculoskeletal: reports: None, Fatigue


Derm: reports: None


MRSA Hx?: No





- Past Surgical History


General: reports: Cholecystectomy


Ortho: reports: Other


/GYN: reports: Dilation and currettage, Mastectomy (bilateral mastectomy; left

axillary lymph node dissection)





- Substance History


Use: Uses substance without health or social issues: NONE





Social History





- Living Situation


Living arrangement: At home


Living Situation: With spouse/s.o.


Support System: 


Patient lives at home with her ernesto  Stephen of 26 years.  Currently both

her daughters Harmony is 24 is at home providing support as well as Allan 23, she 

is getting ready to graduate from [employer].  He reports they are close family, 

have gone through this 8 years ago, that this is a different adventure.  Stephen 

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

from home, and preparing to return a couple of days a week. 








Family History





- Family History


Family History: Mother: , CVA/TIA, Father: , CAD (used DWD), 

Other family: Alive and Well, Hypertension





Medications/Allergies





- Medications


Home Medications: 


                                Ambulatory Orders











 Medication  Instructions  Recorded  Confirmed


 


Felodipine [Plendil] 10 mg PO DAILY 05/07/13 06/10/20


 


Gabapentin 300 mg PO DAILY 05/17/16 06/10/20


 


Glipizide [Glipizide Xl] 2.5 mg PO BID 05/06/20 06/10/20


 


Ondansetron HCl [Zofran] 8 mg PO BID PRN 05/12/20 06/10/20


 


Prochlorperazine Maleate 10 mg PO Q6HR PRN 05/12/20 06/10/20


 


Atorvastatin [Lipitor] 5 mg PO DAILY 05/20/20 06/10/20














- Allergies


Allergies/Adverse Reactions: 


                                    Allergies











Allergy/AdvReac Type Severity Reaction Status Date / Time


 


No Known Drug Allergies Allergy   Verified 06/10/20 08:58














Review of Systems





- Constitutional


Constitutional: reports: Fatigue.  denies: Fever, Chills





- Eyes


Eyes: reports: Vision loss, Corrective lenses





- Ears, Nose & Throat


Ears, Nose & Throat: reports: Hearing loss





- Cardiovascular


Cardiovascular: reports: Edema (LE up to mid calf; tender), Exertional dyspnea 

(with increasing ascites), Decr. exercise tolerance





- Respiratory


Respiratory: reports: Cough (dry; worsens with ascites; relief with 

paracentesis), SOB with exertion





- Gastrointestinal


Gastrointestinal: reports: Abdominal distention, Constipation, Bloating, Good 

appetite.  denies: Nausea, Reflux/heartburn





- Genitourinary


Genitourinary: reports: Frequency





- Neurological


Neurological: reports: General weakness





- Psychiatric


Psychiatric: reports: Depression





- Endocrine


Endocrine: reports: Diabetes type 2





- Hematologic/Lymphatic


Hematologic/Lymphatic: reports: Anemia.  denies: Recurrent infections





- All Other Systems


All Other Systems: reports: Reviewed and negative





Palliative Care





- POLST


Patient has POLST: No


Pain: Comment (pain fluctuates; persistent with LE edema; worsens with pressure 

of ascites; some vague pelvic pain; reliefed with hot tub)


Tiredness/Fatigue: Mild (1-3)


Drowsiness/Sedation: None


Nausea: None


Anorexia: None


Dyspnea: Mild (1-3), Comment (with ascites)


Depression: Mild (1-3)


Anxiety: None


Feelings of wellbeing/Perceived Quality of Life: Good, Acceptable


Constipation: Yes, Intermittent constipation (u)


Performance Status: 


uses miralax





- Palliative Care


Discussion: 


Patient and , very close, are reflective of this is definitely a 

different narrative than their original treatment.  They had given it all, 

without the thought of recurrence.  They do understand the seriousness of her 

current diagnosis, but are hoping for the best and for both quality and quantity

 of life.  We did discuss the emotional ups and downs, particularly normal 

grieving process, as well as impact on family and friends.  Introduced the role 

of advanced care planning, will complete DPOAE.








Results





- Lab Results


Lab results reviewed: Yes





Impression and Recommendations





- Palliative Care


Impression: 


This is a 59-year-old woman with recurrent stage IV breast cancer with mets to 

the bone and pelvis since 3/2020.  Patient does have history of stage IIIb left 

breast intraductal carcinoma, status post bilateral mastectomy, adjuvant chemo 

and radiation.  Patient has had recurrent ascites, lower extremity edema, and 

mild discomfort and fatigue.  Palliative care to provide support and establish 

rapport for ongoing support.





Recommendations/Counseling Done: 


1. Recurrent stage IV metastatic breast cancer to the bone and pelvis.  Patient 

just received paracentesis, hope is for decreased need and increased time.  With

 response to treatment.  Patient also being evaluated for Zometa, did encourage 

follow-up with dental evaluation.  She is somewhat dentist adverse.  Patient 

currently nonsymptomatic regarding her bone mets, but is concerned regarding 

looking forward into the future.  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 

current condition, is online and learning.  She does find information helpful in

 her coping.





2.  Depression.  Patient presents with normal grief and loss issues, counseling 

provided to normalize current grief and emotional reactions.  Goals at this 

point in time are hopefully to return and travel to [place], they do have a home 

there. She is planning to continue to work, hoping to have somewhat a "normal" 

life though recognizing things have changed dramatically. She does have good 

community support both to work and family and friends.  Did introduce the role 

of both palliative care  and  both for her, daughters, or 

 if wanted to access.





3.  Advanced care planning.  Discussed really all that needs to be done at 

current time, is her D POA for healthcare, she is a primary in a secondary, I 

did give simple form.  She does have more expanded form that includes living 

directive.  Currently at this time focus is on improving and hoping for good 

response from her current regimen.  Counseling provided regarding the role of 

palliative care and support, contact information given.  Agreed with check-in 

every few treatments, encouraged to reach out if symptom management issues 

arise.





Time Spent: 


50 minutes with greater than 50% of this done in counseling regarding goals of 

care, quality of life issues, and anticipatory guidance.

## 2020-06-17 ENCOUNTER — HOSPITAL ENCOUNTER (OUTPATIENT)
Dept: HOSPITAL 76 - LAB | Age: 60
Discharge: HOME | End: 2020-06-17
Attending: INTERNAL MEDICINE
Payer: COMMERCIAL

## 2020-06-17 DIAGNOSIS — N05.9: Primary | ICD-10-CM

## 2020-06-17 LAB
ANION GAP SERPL CALCULATED.4IONS-SCNC: 9 MMOL/L (ref 6–13)
BUN SERPL-MCNC: 35 MG/DL (ref 6–20)
CALCIUM UR-MCNC: 8.7 MG/DL (ref 8.5–10.3)
CHLORIDE SERPL-SCNC: 103 MMOL/L (ref 101–111)
CO2 SERPL-SCNC: 22 MMOL/L (ref 21–32)
CREAT SERPLBLD-SCNC: 1.7 MG/DL (ref 0.4–1)
GLUCOSE SERPL-MCNC: 143 MG/DL (ref 70–100)
SODIUM SERPLBLD-SCNC: 134 MMOL/L (ref 135–145)

## 2020-06-17 PROCEDURE — 36415 COLL VENOUS BLD VENIPUNCTURE: CPT

## 2020-06-17 PROCEDURE — 80048 BASIC METABOLIC PNL TOTAL CA: CPT

## 2020-06-29 ENCOUNTER — HOSPITAL ENCOUNTER (OUTPATIENT)
Dept: HOSPITAL 76 - DI | Age: 60
Discharge: HOME | End: 2020-06-29
Attending: INTERNAL MEDICINE
Payer: COMMERCIAL

## 2020-06-29 DIAGNOSIS — R18.0: ICD-10-CM

## 2020-06-29 DIAGNOSIS — C78.6: Primary | ICD-10-CM

## 2020-06-29 DIAGNOSIS — C79.51: ICD-10-CM

## 2020-06-29 PROCEDURE — 49083 ABD PARACENTESIS W/IMAGING: CPT

## 2020-06-29 NOTE — ULTRASOUND REPORT
PROCEDURE:  Abdominal Paracentesis

 

INDICATIONS:  ASCITES, BREAST CA

 

TECHNIQUE:  

The indications, alternatives, benefits, risks, and complications of the procedure were explained to 
the patient.  Written informed consent was obtained and placed in the chart.  The abdomen and pelvis 
were examined sonographically, and an appropriate site was chosen for paracentesis.  The skin was pre
pared and draped in the usual sterile fashion, and 1% lidocaine was infiltrated from the skin down th
rough the peritoneal surface.  A 19-gauge catheter-covered needle was then introduced into the perito
jovany space, the catheter was advanced and the needle was withdrawn, and thereafter peritoneal fluid w
as withdrawn.  The catheter was then removed and a dressing was applied.  The fluid was discarded if 
the clinician did not order diagnostic testing of the fluid.  

 

COMPARISON:  None.

 

FINDINGS:  

Access site:  Right lower quadrant

Needle:  One-Step centesis catheter with introducer needle.  

Fluid volume and description:  4000 cc of serous fluid

Fluid sent for diagnostic testing:  Not requested

Medications:  1% lidocaine for local anaesthesia.  

Complications:  None.  

 

IMPRESSION:  

 Successful ultrasound-guided paracentesis.  

 

Reviewed by: Wes Arana MD on 6/29/2020 2:37 PM PDT

Approved by: Wes Arana MD on 6/29/2020 2:37 PM PDT

 

 

Station ID:  [id number]

## 2020-07-02 ENCOUNTER — HOSPITAL ENCOUNTER (OUTPATIENT)
Dept: HOSPITAL 76 - LAB | Age: 60
Discharge: HOME | End: 2020-07-02
Attending: UROLOGY
Payer: COMMERCIAL

## 2020-07-02 DIAGNOSIS — N13.30: ICD-10-CM

## 2020-07-02 DIAGNOSIS — Z20.828: ICD-10-CM

## 2020-07-02 DIAGNOSIS — Z01.818: Primary | ICD-10-CM

## 2020-07-02 PROCEDURE — 81599 UNLISTED MAAA: CPT

## 2020-07-06 ENCOUNTER — HOSPITAL ENCOUNTER (OUTPATIENT)
Dept: HOSPITAL 76 - SDS | Age: 60
Discharge: HOME | End: 2020-07-06
Attending: UROLOGY
Payer: COMMERCIAL

## 2020-07-06 ENCOUNTER — HOSPITAL ENCOUNTER (OUTPATIENT)
Dept: HOSPITAL 76 - DI | Age: 60
Discharge: HOME | End: 2020-07-06
Attending: INTERNAL MEDICINE
Payer: COMMERCIAL

## 2020-07-06 VITALS — DIASTOLIC BLOOD PRESSURE: 68 MMHG | SYSTOLIC BLOOD PRESSURE: 99 MMHG

## 2020-07-06 DIAGNOSIS — Z78.0: ICD-10-CM

## 2020-07-06 DIAGNOSIS — C79.89: ICD-10-CM

## 2020-07-06 DIAGNOSIS — E11.9: ICD-10-CM

## 2020-07-06 DIAGNOSIS — R18.0: ICD-10-CM

## 2020-07-06 DIAGNOSIS — Z79.84: ICD-10-CM

## 2020-07-06 DIAGNOSIS — Z78.0: Primary | ICD-10-CM

## 2020-07-06 DIAGNOSIS — C79.51: ICD-10-CM

## 2020-07-06 DIAGNOSIS — M85.852: ICD-10-CM

## 2020-07-06 DIAGNOSIS — I10: ICD-10-CM

## 2020-07-06 DIAGNOSIS — N13.1: Primary | ICD-10-CM

## 2020-07-06 PROCEDURE — 77080 DXA BONE DENSITY AXIAL: CPT

## 2020-07-06 PROCEDURE — 52332 CYSTOSCOPY AND TREATMENT: CPT

## 2020-07-06 NOTE — XRAY REPORT
Reason:  URETERAL STENT PLACEMENT

Procedure Date:  07/06/2020   

Accession Number:  [id number] / [id number]                    

Procedure:  FL  - OR C-Arm Procedure CPT Code:  

 

***Final Report***

 

 

FULL RESULT:

 

 

PROCEDURE: OR C-Arm Procedure

 

INDICATIONS: URETERAL STENT PLACEMENT

 

TECHNIQUE: Intraoperative fluoroscopic images were acquired.

 

COMPARISON: CT abdomen pelvis 3/20/2020.

 

FINDINGS:

Limited intraoperative images demonstrate bilateral ureterovesicular 

stents.

 

IMPRESSION:

Bilateral ureterovesicular stents.

 

Reviewed by: Margaret Bales MD on 7/6/2020 10:32 AM PDT

Approved by: Margaret Bales MD on 7/6/2020 10:32 AM PDT

 

 

Station ID:  [id number]

## 2020-07-06 NOTE — DEXA REPORT
Reason:  POST MENOPAUSAL

Procedure Date:  07/06/2020   

Accession Number:  [id number] / [id number]                    

Procedure:  DEX - Dexa Spine and/or Hip CPT Code:  

 

***Final Report***

 

 

FULL RESULT:

 

 

PROCEDURE: Dexa Spine and/or Hip

 

INDICATIONS: POST MENOPAUSAL

 

TECHNIQUE: Dual energy x-ray absorptiometry (DXA) was performed on a [employer] System.  Regions measured are the AP Spine, femoral neck, and if 

needed forearm.

 

COMPARISON: None.

 

FINDINGS:

 

Lumbar Spine:

Bone Mineral Density   1.313 g/cm/cm,T score  1.1, normal, change from 

previous, increased. Previous T score was -0.9. Statistical comparison 

not possible secondary to changes in scan technique.

 

Left Hip:

Bone Mineral Density  0.899 g/cm/cm,T score -0.9, normal, change from 

previous, decreased. Previous T score was 0.1.

 

Left Femoral Neck:

Bone Mineral Density  0.875 g/cm/cm, T score -1.2, osteopenia, change 

from previous, decreased. Previous T score was -0.5

 

 

(T score greater or equal to -1.0: NORMAL)

(T score from -1.1 to -2.4: OSTEOPENIA)

(T score less than or equal to -2.5 to: OSTEOPOROSIS)

 

Impression:

 

1. Osteopenia of the left femoral neck elevates the patient's fracture 

risk.

 

2. Decrease in left hip bone mineral density compared to the prior study.

 

Patients with diagnosis of osteoporosis or osteopenia should have regular 

bone mineral density assessment.  For those eligible for Medicare, 

routine testing is allowed once every 2 years.  Testing frequency can be 

increased for patients who have rapidly progressing disease or for those 

who are receiving medical therapy to restore bone mass.

 

Reviewed by: Janelle Maldonado MD on 7/6/2020 5:22 PM PDT

Approved by: Janelle Maldonado MD on 7/6/2020 5:22 PM PDT

 

 

Station ID:  [id number]

## 2020-07-06 NOTE — ANESTHESIA
Pre-Anesthesia VS, & Labs





- Diagnosis


Renal obstruction





- Procedure





Cystoscopy urethreal stent placement


Vital Signs: 





                                        











Temp Pulse Resp BP Pulse Ox


 


 36.4 C L  107 H  16   97/71   100 


 


 07/06/20 08:06  07/06/20 08:06  07/06/20 08:06  07/06/20 08:06  07/06/20 08:06














                                        





Height                           5 ft 4 in


Weight (kg)                      47 kg


Body Mass Index                  18.8











- NPO


>8 hours





- Pregnancy


Is Patient Pregnant?: No





- Lab Results


Lab results reviewed: Yes





Home Medications and Allergies





                                        





Felodipine [Plendil] 10 mg PO DAILY 05/07/13 


Gabapentin 300 mg PO DAILY 05/17/16 


Glipizide [Glipizide Xl] 2.5 mg PO BID 05/06/20 


Ondansetron HCl [Zofran] 8 mg PO BID PRN 05/12/20 


Prochlorperazine Maleate 10 mg PO Q6HR PRN 05/12/20 


Atorvastatin [Lipitor] 10 mg PO DAILY 05/20/20 








Allergies/Adverse Reactions: 


                                    Allergies











Allergy/AdvReac Type Severity Reaction Status Date / Time


 


No Known Drug Allergies Allergy   Verified 06/10/20 08:58














Anes History & Medical History





- Anesthetic History


Anesthesia Complications: reports: No previous complications


Family history of Anesthesia Complications: Denies


Family history of Malignant Hyperthermia: Denies





- Medical History


Cardiovascular: reports: Hypertension


Pulmonary: reports: None


Gastrointestinal: reports: None


Urinary: reports: Other


Musculoskeletal: reports: None


Endocrine/Autoimmune: reports: Type 2 diabetes


Blood Disorders: reports: Anemia


Skin: reports: None


Smoking Status: Never smoker





- Surgical History


General: Cholecystectomy, Other


Gynecologic: Dilation and currettage, Mastectomy


Orthopedic: Other





Exam


General: Alert, Oriented x3, Cooperative


Dental: WNL


Mouth Opening: 3 Fingerbreadth


Neck Mobility: Normal


Mallampati classification: I


Thyromental Distance: 4-6 cm


Respiratory: Lungs clear


Cardiovascular: Regular rate





Plan


Anesthesia Type: General, Total IV


Consent for Procedure(s) Verified and Reviewed: Yes


Code Status: Attempt Resuscitation


ASA classification: 3-Severe systemic disease


Is this case an emergency?: No

## 2020-07-09 NOTE — OPERATIVE REPORT
DATE OF SERVICE: 07/06/2020

Physician: Leonora Ortiz MD

 

PROCEDURE PERFORMED:  Cystoscopy with bilateral double-J stent exchange.

 

SURGEON:  Leonora Ortiz MD

 

ANESTHESIA:  General.

 

PREOPERATIVE DIAGNOSIS:  Bilateral ureteral obstruction.

 

POSTOPERATIVE DIAGNOSIS:  Bilateral ureteral obstruction.

 

INDICATIONS:  Patient is a 60-year-old woman with a history of bilateral 
ureteral obstruction related to metastatic breast cancer, brought to the 
operating room for routine double-J stent exchange.  She was counseled about 
risks, benefits, and wished to proceed.

 

PROCEDURE IN DETAIL:  After appropriate informed consent was obtained, patient 
was brought to the operating room.  She received IV antibiotics prior to the 
procedure.  SCDS were placed.  Adequate general anesthesia was induced.  She was
carefully placed in dorsal lithotomy position.  All pressure points carefully 
padded.  Cleaned, prepped and draped in the usual sterile fashion.  



Rigid scope was introduced into her bladder.  The ends of both stents were seen.
 They were minimally changed with age dwell time in bladder.  We grasped first 
the right stent, and this was brought to the meatus.  We passed a wire up 
through the stent into good position in the renal pelvis as seen 
fluoroscopically.  The stent itself was removed.  We then backloaded the wire 
through the scope and advanced a fresh 8-Faroese x 24 cm double-J stent over the 
wire in a good position in the patient's kidney with a curl seen 
fluoroscopically proximally and a curl seen physically distally.  The fit was 
snug, but it lay in good position.  



We then turned our attention to the left side.  We passed a wire up alongside 
the left-sided double-J stent fluoroscopically to see this into the kidney.  
Removed this with the alligator forceps, then backloaded the wire through the 
scope.  Advanced a fresh 8-Faroese x 26 cm double-J stent over the wire through 
the scope.  Curl seen fluoroscopically on the cephalad end and a curl seen 
through the scope on the distal end, was a good position.  This was likewise 
somewhat compressed and stiff to get in, but settled into a very nice position. 
We irrigated out some minimal hematuria into the bladder, drained the bladder.  
Patient tolerated the procedure well, was awakened and taken in stable condition
to the postanesthesia care unit.

 

 

DD: 07/09/2020 11:53

TD: 07/09/2020 11:59

Job #: [id number]

[facility]

## 2020-07-13 ENCOUNTER — HOSPITAL ENCOUNTER (OUTPATIENT)
Dept: HOSPITAL 76 - DI | Age: 60
Discharge: HOME | End: 2020-07-13
Attending: INTERNAL MEDICINE
Payer: COMMERCIAL

## 2020-07-13 DIAGNOSIS — C79.51: ICD-10-CM

## 2020-07-13 DIAGNOSIS — C78.6: Primary | ICD-10-CM

## 2020-07-13 DIAGNOSIS — R18.0: ICD-10-CM

## 2020-07-13 LAB
APTT PPP: 39.4 SECS (ref 24.9–33.3)
INR PPP: 1.1 (ref 0.8–1.2)
PROTHROM ACT/NOR PPP: 13 SECS (ref 9.9–12.6)

## 2020-07-13 PROCEDURE — 49083 ABD PARACENTESIS W/IMAGING: CPT

## 2020-07-13 PROCEDURE — 85730 THROMBOPLASTIN TIME PARTIAL: CPT

## 2020-07-13 PROCEDURE — 36415 COLL VENOUS BLD VENIPUNCTURE: CPT

## 2020-07-13 PROCEDURE — 85610 PROTHROMBIN TIME: CPT

## 2020-07-13 NOTE — ULTRASOUND REPORT
PROCEDURE:  Abdominal Paracentesis

 

INDICATIONS:  ASCITES, BREAST CA

 

TECHNIQUE:  

The indications, alternatives, benefits, risks, and complications of the procedure were explained to 
the patient.  Written informed consent was obtained and placed in the chart.  The abdomen and pelvis 
were examined sonographically, and an appropriate site was chosen for paracentesis.  The skin was pre
pared and draped in the usual sterile fashion, and 1% lidocaine was infiltrated from the skin down th
rough the peritoneal surface.  A 19-gauge catheter-covered needle was then introduced into the perito
jovany space, the catheter was advanced and the needle was withdrawn, and thereafter peritoneal fluid w
as withdrawn.  The catheter was then removed and a dressing was applied.  The fluid was discarded if 
the clinician did not order diagnostic testing of the fluid.  

 

COMPARISON:  None

 

FINDINGS:  

Access site:  Right lower quadrant

Needle:  One-Step centesis catheter with introducer needle.  

Fluid volume and description:  3.5 L clear yellow

Fluid sent for diagnostic testing:  No

Medications:  1% lidocaine for local anaesthesia.  

Complications:  None.  

 

IMPRESSION:  

    Successful ultrasound-guided paracentesis.  

 

Reviewed by: Margaret Bales MD on 7/13/2020 5:08 PM PDT

Approved by: Margaret Bales MD on 7/13/2020 5:08 PM PDT

 

 

Station ID:  [id number]

## 2020-07-27 ENCOUNTER — HOSPITAL ENCOUNTER (OUTPATIENT)
Dept: HOSPITAL 76 - DI | Age: 60
Discharge: HOME | End: 2020-07-27
Attending: INTERNAL MEDICINE
Payer: COMMERCIAL

## 2020-07-27 DIAGNOSIS — C78.6: Primary | ICD-10-CM

## 2020-07-27 DIAGNOSIS — C79.51: ICD-10-CM

## 2020-07-27 DIAGNOSIS — R18.0: ICD-10-CM

## 2020-07-27 PROCEDURE — 49083 ABD PARACENTESIS W/IMAGING: CPT

## 2020-07-27 NOTE — ULTRASOUND REPORT
PROCEDURE:  Abdominal Paracentesis

 

INDICATIONS:  ASCITES, BREAST CA

 

TECHNIQUE:  

The indications, alternatives, benefits, risks, and complications of the procedure were explained to 
the patient.  Written informed consent was obtained and placed in the chart.  The abdomen and pelvis 
were examined sonographically, and an appropriate site was chosen for paracentesis.  The skin was pre
pared and draped in the usual sterile fashion, and 1% lidocaine was infiltrated from the skin down th
rough the peritoneal surface.  A 19-gauge catheter-covered needle was then introduced into the perito
jovany space, the catheter was advanced and the needle was withdrawn, and thereafter peritoneal fluid w
as withdrawn.  The catheter was then removed and a dressing was applied.  The fluid was discarded if 
the clinician did not order diagnostic testing of the fluid.  

 

COMPARISON:  None

 

FINDINGS:  

Access site:  Right lower quadrant

Needle:  One-Step centesis catheter with introducer needle.  

Fluid volume and description:  3800 cc of clear yellow

Fluid sent for diagnostic testing:  Requested

Medications:  1% lidocaine for local anaesthesia.  

Complications:  None.  

 

 

IMPRESSION:  

 

Successful ultrasound-guided paracentesis. No immediate complications. 

 

Reviewed by: Susan Brewer MD, PhD on 7/27/2020 2:28 PM PDT

Approved by: Susan Brewer MD, PhD on 7/27/2020 2:28 PM PDT

 

 

Station ID:  [id number]

## 2020-08-10 ENCOUNTER — HOSPITAL ENCOUNTER (OUTPATIENT)
Dept: HOSPITAL 76 - DI | Age: 60
Discharge: HOME | End: 2020-08-10
Attending: INTERNAL MEDICINE
Payer: COMMERCIAL

## 2020-08-10 DIAGNOSIS — C79.51: ICD-10-CM

## 2020-08-10 DIAGNOSIS — R18.0: ICD-10-CM

## 2020-08-10 DIAGNOSIS — C78.6: Primary | ICD-10-CM

## 2020-08-10 DIAGNOSIS — C50.919: ICD-10-CM

## 2020-08-10 PROCEDURE — 49083 ABD PARACENTESIS W/IMAGING: CPT

## 2020-08-10 NOTE — ULTRASOUND REPORT
PROCEDURE:  Abdominal Paracentesis

 

INDICATIONS:  ASCITES, BREAST CA

 

TECHNIQUE:  

The indications, alternatives, benefits, risks, and complications of the procedure were explained to 
the patient.  Written informed consent was obtained and placed in the chart.  The abdomen and pelvis 
were examined sonographically, and an appropriate site was chosen for paracentesis.  The skin was pre
pared and draped in the usual sterile fashion, and 1% lidocaine was infiltrated from the skin down th
rough the peritoneal surface.  A 19-gauge catheter-covered needle was then introduced into the perito
jovany space, the catheter was advanced and the needle was withdrawn, and thereafter peritoneal fluid w
as withdrawn.  The catheter was then removed and a dressing was applied.  The fluid was discarded if 
the clinician did not order diagnostic testing of the fluid.  

 

COMPARISON:  None

 

FINDINGS:  

Access site:  Right lower quadrant

Needle:  One-Step centesis catheter with introducer needle.  

Fluid volume and description:  3400 cc

Fluid sent for diagnostic testing:  Yellow

Medications:  1% lidocaine for local anaesthesia.  

Complications:  None.  

 

IMPRESSION:  

    Successful ultrasound-guided paracentesis.  

 

Reviewed by: Margaret Bales MD on 8/10/2020 4:21 PM PDT

Approved by: Margaret Bales MD on 8/10/2020 4:21 PM PDT

 

 

Station ID:  [id number]

## 2020-08-26 ENCOUNTER — HOSPITAL ENCOUNTER (OUTPATIENT)
Dept: HOSPITAL 76 - DI | Age: 60
Discharge: HOME | End: 2020-08-26
Attending: INTERNAL MEDICINE
Payer: COMMERCIAL

## 2020-08-26 DIAGNOSIS — R18.0: ICD-10-CM

## 2020-08-26 DIAGNOSIS — C78.6: Primary | ICD-10-CM

## 2020-08-26 DIAGNOSIS — C79.51: ICD-10-CM

## 2020-08-26 PROCEDURE — 49083 ABD PARACENTESIS W/IMAGING: CPT

## 2020-08-26 NOTE — ULTRASOUND REPORT
PROCEDURE:  Abdominal Paracentesis

 

INDICATIONS:  ASCITES

 

TECHNIQUE:  

The indications, alternatives, benefits, risks, and complications of the procedure were explained to 
the patient.  Written informed consent was obtained and placed in the chart.  The abdomen and pelvis 
were examined sonographically, and an appropriate site was chosen for paracentesis.  The skin was pre
pared and draped in the usual sterile fashion, and 1% lidocaine was infiltrated from the skin down th
rough the peritoneal surface.  A 19-gauge catheter-covered needle was then introduced into the perito
jovany space, the catheter was advanced and the needle was withdrawn, and thereafter peritoneal fluid w
as withdrawn.  The catheter was then removed and a dressing was applied.  The fluid was discarded if 
the clinician did not order diagnostic testing of the fluid.  

 

COMPARISON:  None.

 

FINDINGS:  

Access site:  Right lower quadrant

Needle:  One-Step centesis catheter with introducer needle.  

Fluid volume and description:  1800 cc of serous ascites

Fluid sent for diagnostic testing:  Not requested

Medications:  1% lidocaine for local anaesthesia.  

Complications:  None.  

 

IMPRESSION:  

Technically successful ultrasound-guided paracentesis.  

 

Reviewed by: Wes Arana MD on 8/26/2020 2:18 PM PDT

Approved by: Wes Arana MD on 8/26/2020 2:18 PM PDT

 

 

Station ID:  [id number]

## 2020-09-21 ENCOUNTER — HOSPITAL ENCOUNTER (OUTPATIENT)
Dept: HOSPITAL 76 - LAB | Age: 60
Discharge: HOME | End: 2020-09-21
Attending: INTERNAL MEDICINE
Payer: COMMERCIAL

## 2020-09-21 DIAGNOSIS — C78.6: Primary | ICD-10-CM

## 2020-09-21 DIAGNOSIS — R18.0: ICD-10-CM

## 2020-09-21 DIAGNOSIS — C79.51: ICD-10-CM

## 2020-09-21 LAB
APTT PPP: 37.3 SECS (ref 24.9–33.3)
INR PPP: 1.1 (ref 0.8–1.2)
PROTHROM ACT/NOR PPP: 12.5 SECS (ref 9.9–12.6)

## 2020-09-21 PROCEDURE — 85610 PROTHROMBIN TIME: CPT

## 2020-09-21 PROCEDURE — 76705 ECHO EXAM OF ABDOMEN: CPT

## 2020-09-21 PROCEDURE — 36415 COLL VENOUS BLD VENIPUNCTURE: CPT

## 2020-09-21 PROCEDURE — 85730 THROMBOPLASTIN TIME PARTIAL: CPT

## 2020-09-21 NOTE — ULTRASOUND REPORT
PROCEDURE: Abdomen Limited

 

INDICATIONS:  CANCELLED PARA, LACK OF ASCITES

 

TECHNIQUE:  

Real-time focused scanning was performed of the abdomen, with image documentation.  

 

COMPARISON:  8/26/2020

 

FINDINGS:  A small amount of fluid is present in the lower quadrants, no enough for safe paracentesis
.

 

IMPRESSION:  

 

1. Small amount of ascites, insufficient quantity for paracentesis.

 

Reviewed by: Janelle Maldonado MD on 9/21/2020 11:13 AM MED

Approved by: Janelle Maldonado MD on 9/21/2020 11:13 AM MED

 

 

Station ID:  [id number]

## 2021-01-06 ENCOUNTER — HOSPITAL ENCOUNTER (OUTPATIENT)
Dept: HOSPITAL 76 - DI | Age: 61
Discharge: HOME | End: 2021-01-06
Attending: INTERNAL MEDICINE
Payer: COMMERCIAL

## 2021-01-06 DIAGNOSIS — C79.51: ICD-10-CM

## 2021-01-06 DIAGNOSIS — C50.919: Primary | ICD-10-CM

## 2021-01-06 NOTE — XRAY REPORT
PROCEDURE:  Thoracic Spine 3 View

 

INDICATIONS:  BREAST CA

 

TECHNIQUE:  3 views of the thoracic spine were acquired.  

 

COMPARISON:  None.

 

FINDINGS:  

 

Bones:  No fractures or dislocations but there is generalized heterogeneity and increased radiodensit
y of the axial and appendicular structures included on this imaging.  No suspicious bony lesions.  12
 pairs of ribs are noted, and appear intact where visualized.  

 

Soft tissues:  No paravertebral stripe thickening.  

 

IMPRESSION:  

Extensive osseous metastatic disease without compression fractures identified.

 

Reviewed by: Johnnie Ta MD on 1/6/2021 5:26 PM PST

Approved by: Johnnie Ta MD on 1/6/2021 5:26 PM PST

 

 

Station ID:  [id number]

## 2021-01-06 NOTE — XRAY REPORT
PROCEDURE:  Cervical Spine 2 View

 

INDICATIONS:  BREAST CA

 

TECHNIQUE:  3 view(s) of the cervical spine were acquired.  

 

COMPARISON:  None.

 

FINDINGS:  

 

Bones:  No fractures or dislocations to the T1 level.  The lateral masses of C1 appear intact on the 
odontoid view.  No suspicious bony lesions.  Extensive osteoblastic metastatic disease, no compressio
n fracture seen. Note is made of a Port-A-Cath superimposed on the upper left chest in normal positio
n.

 

Soft tissues:  No prevertebral soft tissue swelling.  

 

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 
through the thoracic and lumbosacral spine on additional imaging today. No compression fracture.

 

Reviewed by: Johnnie Ta MD on 1/6/2021 5:29 PM PST

Approved by: Johnnie Ta MD on 1/6/2021 5:29 PM PST

 

 

Station ID:  [id number]

## 2021-01-06 NOTE — XRAY REPORT
PROCEDURE:  Lumbar Spine 2 View

 

INDICATIONS:  BREAST CA

 

TECHNIQUE:  2 views of the lumbar spine were acquired.  

 

COMPARISON:  Thoracic spine plain films same day

 

FINDINGS:  

 

Bones:  5 non-rib-bearing vertebrae are present.  There is normal bony alignment.  No vertebral body 
compression fractures. There is generalized osteoblastic metastatic disease involving the pelvis, pro
ximal femurs, and the visualized lower lumbosacral spine and thoracolumbar regions..  

 

Soft tissues:  Overlying bowel gas pattern is normal.  No suspicious soft tissue calcifications. Note
 is made of bilateral double pigtail ureteral catheters, in expected position  

 

IMPRESSION:  Extensive osteoblastic metastatic disease, bilateral ureteral pigtail catheters are pres
ent in expected position.

 

Reviewed by: Johnnie Ta MD on 1/6/2021 5:28 PM PST

Approved by: Johnnie Ta MD on 1/6/2021 5:28 PM PST

 

 

Station ID:  [id number]

## 2021-01-06 NOTE — ONCOLOGY/HEMATOLOGY VISIT
HEME/ONC PROGRESS NOTE: 


MARCIANO/ONC PROGRESS NOTE: 


ONCOLOGY HISTORY:


1.  -Recurrent stage IV, ER/MS positive, HER-2 negative breast cancer with 

metastases to bone and pelvis since 3/2028


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


-Bilateral mastectomy, left axillary lymph node dissection, adjuvant 

chemotherapy with AC/T, cycle 12 Taxol discontinued due to neuropathy, status 

post adjuvant XRT to left chest wall completed 7/20/2013


-Aromasin for 1 year, but no continuation of therapy after this due to unclear 

reasons


-Presented with peritoneal carcinomatosis and ascites with pelvic mass on CT on 

3/2020


-Biopsy pelvic mass positive for primary breast cancer, ER +99%, MS +9 9%, HER-2

negative


-Cycle 1 day 1 Doxil 40 gonzalo per meter squared 5/12/2020-Cycle 2 with increased 

Doxil 50 gonzalo per meter squared


-Completed cycle 4 of Doxil on 8/5/2020


-Letrozole 2.5 mg started 8/5/2020


-Dose reduction of letrozole 2.5 mg to every other day to 2 increasing diarrhea 

while on letrozole


-Switched letrozole to anastrozole on 9/2/2020 for better symptom toleration


-C1D1 palboclclib 125 mg D1-21 q28 D on 9/2/2020


-dose reduction palbo to 100 mg  on 10/9/2020 due to neutropaenia 


-Dose reduction Palko to 75 mg on 12/2020 due to persistent neutropenia's





HISTORY OF CURRENT ILLNESS/REVIEW OF SYSTEMS:


Patient presents for ongoing follow-up of the above complex medical issues





Since last seen,Patient presents for short-term interval follow-up for acute 

onset back pain.





Patient reports that on 12/30/2020 while she was bent over brushing her teeth 

when coming back upwards she developed acute onset severe lower mid back pain 

with pain that was so severe that she could not get up right and require to a 

chair without assistance





Since last week she has been taking Advil per package directions which has been 

helping and she notes that the pain has steadily improved and today minimal 3 

out of 10 with no associated lower extremity weakness, no bowel or bladder 

control issues no increasing numbness or tingling, and today generally feels 

better





She does however report dull aching persistent right sided flank pain although 

denies any hematuria at this time





Patient currently tolerating reduced dose palbociclib currently at 75 mg with no

active issues tolerating this





No other new constitutional symptoms today


Review of systems: A complete review of systems was otherwise negative in detail

except per HPI





FAMILY/SOCIAL HISTORY:


Reviewed per initial consultation note dated 5/6/2020 with no changes today








PHYSICAL EXAM:


Vital signs: Reviewed per chart


GEN: AAOX3, NAD


HEENT: EOMI MMM, no thrush


LYMPH: No cervical or supraclavicular lymphadenopathy 


Cards: Regular rate and rhythm


Abdomen: Soft nontender nondistended


Extremities: No extremity edema noted, No point tenderness to palpation of 

cervical thoracic lumbar or sacral spine, mild CVA tenderness of left and right 

spaces


Skin: No bruises or rash 


Neuro: No focal neurological deficits


Psych: Appropriately conversant, normal affect 








LABS: 


No recent lab work today





IMAGING


No recent imaging





ASSESSMENT/PLAN:


1. Widely metastatic breast cancer with diffuse bony metastases-Patient 

tolerating treatment well at dose reduction of 75 mg





Options would be to continue on current therapy versus switching to abemaciclib 

which may have a decreased risk of neutropenia however with an increased risk of

diarrhea





However as patient is currently tolerating regimen well with otherwise no other 

significant side effects patient would prefer to remain on this therapy at this 

time


 


-Continue with Ibrance 75 mg daily,, 21 days on Of a 28-day cycle as long as ANC

greater than 1000


-Continue anastroze 1 mg daily


-Next restaging scan to be done March 2021





2.Toxicity monitoring- 


-No dose adjustment necessary for grade 1-2 neutropenia (ANC greater than 1000)


-If patient with grade 3 toxicity (-1000), and no other significant 

symptoms, recommendations are to continue out to 22 at current dose With repeat 

CBC on day 22


-If patient with grade 4 toxicity (ANC less than 500 ) Recommendations are to 

hold palbociclib until ANC improved to grade 2 or better toxicity with 

recommendations to start next cycle at lower dose





3.  Treatment related anemia And neutropenia Resolved to grade 1-2-Secondary to 

Ibrance therapy, Continue with current dose at this time as long as labs in 1 

week show grade 2 or better neutropenia


-Continue to monitor at this time no indication for transfusion


-Discussed options for rotating to abemaciclib as patient has had to hold 

treatment several times.  However, ANC appropriate to continue with palbocyclib 

at current dosing and patient would prefer to maintain current regimen for now 





4. Extensive bony metastatic disease-Previously discussed benefits of Zometa 

given extensive osseous metastatic disease however patient had concerns over 

osteonecrosis of the Jaw.


-Defer Zometa at this time per patient preference





5.  Acute onset back pain-likely musculoskeletal, with muscle spasm given 

ongoing improvement of pain and lack of point tenderness to palpation along 

total spine, imaging from 12/2/2020 reviewed in detail with no pathologic 

fractures although with significant multilevel sclerotic lesions consistent with

her known metastatic breast cancer.  Clinical exam and symptoms today are not 

consistent with a pathologic fracture or concerning findings for cord 

compression


-Total spine x-ray to rule out pathologic fracture





 


6.Subacute renal failure secondary to hydronephrosis from tumor compression-

Patient with mild bilateral CVA tenderness, unclear if this is related to her 

underlying hydronephrosis or her known existing tumors


-Recheck creatinine today





7. Treatment related dry eyes-side effects reviewed with 4 to 6% of patients 

getting blurry vision from palbociclib


-Recommended eyedrops to 3-4 times per day


-Recommended follow-up with ophthalmology





Follow-up , 2 weeks time with MD for ongoing toxicity assessment And review of 

labs





Time spent: 25 minutes of which greater than 50% was spent in patient counseling

and care coordination as documented in HPI and assessment and plan








Clinical Data: 


                                    Allergies





No Known Drug Allergies Allergy (Verified 01/06/21 15:54)


   





                                Home Medications





Felodipine [Plendil] 10 mg PO DAILY 05/07/13 [History Last Taken 07/06/20]


Gabapentin 300 mg PO DAILY 05/17/16 [History Last Taken 07/05/20]


Glipizide [Glipizide Xl] 2.5 mg PO BID 05/06/20 [History Last Taken 07/06/20]


Prochlorperazine Maleate 10 mg PO Q6HR PRN 05/12/20 [History Last Taken Unknown]


ondansetron HCL [Zofran] 8 mg PO BID PRN 05/12/20 [History Last Taken Unknown]


Atorvastatin [Lipitor] 10 mg PO DAILY 05/20/20 [History Last Taken 07/06/20]


Palbociclib [Ibrance] 100 mg PO DAILY 10/14/20 [History Last Taken Unknown]


Palbociclib [Ibrance] 75 mg PO DAILY MDD x 21 days, 7 days off 12/24/20 [History

 Last Taken Unknown]

## 2021-03-04 ENCOUNTER — HOSPITAL ENCOUNTER (OUTPATIENT)
Dept: HOSPITAL 76 - PC | Age: 61
Discharge: HOME | End: 2021-03-04
Attending: NURSE PRACTITIONER
Payer: COMMERCIAL

## 2021-03-04 DIAGNOSIS — R53.83: ICD-10-CM

## 2021-03-04 DIAGNOSIS — C79.51: ICD-10-CM

## 2021-03-04 DIAGNOSIS — G89.3: ICD-10-CM

## 2021-03-04 DIAGNOSIS — Z51.5: Primary | ICD-10-CM

## 2021-03-04 DIAGNOSIS — C79.89: ICD-10-CM

## 2021-03-04 DIAGNOSIS — Z79.899: ICD-10-CM

## 2021-03-04 DIAGNOSIS — C50.919: ICD-10-CM

## 2021-03-04 PROCEDURE — 99215 OFFICE O/P EST HI 40 MIN: CPT

## 2021-03-04 NOTE — CONSULTATION NOTE
Palliative Care Follow Up





- Referral


Referring Provider: Dr. Tani Ludwig


Time of Visit: 1030-11; Record review/labs/scans 15 min


Referral setting: [facility]


Referral Reason: Pain of neoplastic origin/Recurrent breast CA with pelvic mets





- Information Sources


Records reviewed: Previous records reviewed


History/Review of Systems obtained from: Patient


Exam limitations: No limitations





- History of Present Illness Update


Brief HPI Update: 


This is a ernesto 59-year-old woman who has recurrent stage IV breast cancer with

mets to the bone and pelvis since 3/2020.  She originally presented with 

increasing creatinine on routine labs, who was sent for renal ultrasound which 

showed bilateral hydronephrosis and ascites. As CT scan showed a 12 cm complex 

pelvic mass, moderate ascites at that time.  She has since had biopsy, as well 

as ureteral stents, which she is due to have replaced again in April.  She has 

responded to treatment, with no recent need for paracentesis.  She has mild 

rounding of her abdomen of her abdomen, but soft and no noted tenderness per 

patient.  She does present with mild activity intolerance and shortness of 

breath with exertion. She has noted progressive fatigue, but still feeling able 

to tolerate her work schedule and home activities, pacing herself.





Patient's last imaging was done on 12/02, that did show extensive bony sclerotic

metastatic disease, changes of chronic pancreatitis, and bilateral 

hydronephrosis.  Which of the right kidney was small and shrunken, left kidney 

was normal size, and at that time he had developed some increased moderate to 

severe right hydronephrosis, with slight interval decrease in left 

hydronephrosis but still moderate.  She has also had x-rays, that show 

osteoblastic metastatic disease to her multiple areas in the spine as well as 

pelvis and proximal femur.





In the context of her pain she is having bony pain with up and down her spine 

and across pelvis and ischium.  She has been long-term on gabapentin from her 

previous treatments residual peripheral neuropathy.  Of gabapentin at 300 mg at 

bedtime.  She was noting increased discomfort, and was looking to explore 

options for pain management.  Recommended since she wanted to avoid opioids, 

pregabalin to be able to titrate quicker, with pregabalin 25 mg in the a.m., she

has been taking 50 in p.m., she had not discontinued her gabapentin and but will

so at this point in time.





Given she did get some relief, and tolerated without increased sedation, we will

go ahead and have her take pregabalin 25 mg a.m., 25 noonish, and 50 mg at 

bedtime and discontinue gabapentin.  Her creatinine clearance currently is about

29, which maximizes its out between 150 and 200 mg of pregabalin, she is due to 

have her stents replaced if she does have improvement, she can have 300 mg with 

a creatinine clearance of 30 or greater.





She feels overall things are going fairly well, her most persistent symptom has 

been her fatigue, she has noticed changes in doubly mild decline but nothing 

that has interfered significantly in her quality of life.  Though she is having 

to pace her activities.  Her weight is remained stable at home 15, no recent 

need for paracentesis, and is continuing currently on the Ibrance at reduced 

dose of 75 mg due to persistent neutropenia, her last white count was 1.7.  On 

216 her creatinine was 1.8, estimated GFR 29,








Social History





- Living Situation


Living arrangement: At home


Living Situation: With spouse/s.o., With family


Support System: 


She lives at home with her , Stephen of 26 years.  Currently both her 

daughters are at home providing support, and has been quite limiting as for most

families with the pandemic.  She is looking forward to being able to go to 

[place], this is a special place for all of them, she will be going in May for 2 

months and is looking forward to this as a short-term goal.








Medications/Allergies





- Medications


Home Medications: 


                                Ambulatory Orders











 Medication  Instructions  Recorded  Confirmed


 


Felodipine [Plendil] 10 mg PO DAILY 05/07/13 03/04/21


 


Glipizide [Glipizide Xl] 2.5 mg PO BID 05/06/20 03/04/21


 


Prochlorperazine Maleate 10 mg PO Q6HR PRN 05/12/20 03/04/21


 


ondansetron HCL [Zofran] 8 mg PO BID PRN 05/12/20 03/04/21


 


Atorvastatin [Lipitor] 10 mg PO DAILY 05/20/20 03/04/21


 


Palbociclib [Ibrance] 75 mg PO DAILY MDD x 21 days, 7 12/24/20 03/04/21





 days off  


 


Anastrozole 1 mg PO DAILY 03/04/21 03/04/21


 


Pregabalin [Lyrica] 25 mg PO .AM NOON 50MG HS 03/04/21 03/04/21














- Allergies


Allergies/Adverse Reactions: 


                                    Allergies











Allergy/AdvReac Type Severity Reaction Status Date / Time


 


No Known Drug Allergies Allergy   Verified 02/17/21 15:32














Review of Systems





- Constitutional


Constitutional: reports: Fatigue (persistent/worsening), Weight stable (115; 

feels good baseline; regained weight loss).  denies: Fever, Chills





- Eyes


Eyes: reports: Vision loss, Other (having watery eyes result of ibrance)





- Cardiovascular


Cardiovascular: reports: Lightheadedness, Exertional dyspnea, Decr. exercise 

tolerance





- Respiratory


Respiratory: reports: SOB with exertion.  denies: Cough, SOB at rest





- Gastrointestinal


Gastrointestinal: reports: Early satiety.  denies: Abdominal pain, Constipation,

 Diarrhea, Nausea, Reflux/heartburn





- Musculoskeletal


Musculoskeletal: reports: Back pain, Muscle aches, Stiffness





- Psychiatric


Psychiatric: denies: Depression, Anxiety





- Endocrine


Endocrine: reports: Diabetes type 2 (9/20 aic 6.6)





- Hematologic/Lymphatic


Hematologic/Lymph: reports: Anemia (2/16 9.9 Hbg), Other (WBC 1.7 2/16 no recent

 infections)





- All Other Systems


All Other Systems: reports: Reviewed and negative





Physical Exam





- Vital Signs


Pulse Rate: 100


Respiratory Rate: 16 (20 with exertion)


Blood Pressure: 107/68





- Physical Exam


General Appearance: positive: No acute distress, Alert


Eyes Bilateral: positive: Normal inspection, No scleral icterus


ENT: positive: No signs of dehydration


Neck: positive: Trachea midline


Cardiovascular: positive: Regular rate & rhythm, Tachycardia


Respiratory: positive: Diminished in bases.  negative: No respiratory distress 

(breathlessness with activity), Wheezes


Abdomen: positive: Non-tender, Other (rounded)


Skin: positive: Dryness


Extremities: positive: Pedal edema (trace)


Neurologic/Psychiatric: positive: Oriented x3, Mood/affect nml





Palliative Care





- POLST


Patient has POLST: No


POLST Status: Full Code


Pain: Pain improved, Location (across back and buttocks; discomfort with LE 

edema), Severity (2/10), Comment (worse in AM on awakening; started pregabalin 

25 mg AM, 50 mg at HS; inst. to stop gabapentin)


Tiredness/Fatigue: Mild (1-3)


Drowsiness/Sedation: None


Nausea: None


Anorexia: None


Dyspnea: Mild (1-3)


Depression: None


Anxiety: None


Feelings of wellbeing/Perceived Quality of Life: Good, Acceptable, Comment (noti

ng changes but not acute)


Sleep: Sleeps well


Constipation: No


Performance Status: 


Patient has noted slight functional decline, with decreased activity tolerance 

and persistent fatigue. She is independent in her ADLs, she is a ECOG 1.








- Palliative Care


Discussion: 


Palliative care has not seen patient since August, she has continued to work.  

She feels like she is doing fairly well overall given her current situation.  I 

would agree.  She does not appear depressed or anxious, did explore some 

anticipatory guidance questions, she has scans pending, and due for stent 

exchange. I suspect we will have more information to be able to answer some of 

her questions regarding anticipatory guidance and prognosis.  Her tumor markers 

are staying fairly stable, she is not needed recurrent paracentesis, her 

nutritional status is good, no acute hospitalizations or infections. 








Results





- Lab Results


Lab results reviewed: Yes





Impression and Recommendations





- Palliative Care


Impression: 


This is a ernesto 59-year-old woman with recurrent stage IV lobular breast cancer

 with mets to the bone, and pelvic mass since 3/2020.  She is currently 

receiving Ibrance and anastrozole, her disease has been fairly stable.  She was 

having exacerbation or bone pain, have trialed pregabalin with positive 

response.  Palliative care to provide support regarding symptom management as 

well as anticipatory guidance.





Recommendations/Counseling Done: 


1. Pain of neoplastic origin.  Patient presents with multifactorial pain, 

attributed mostly to bone mets and peripheral neuropathy.  Have transitioned and

 will continue to follow transition over to pregabalin, dosing 25 mg a.m., 25 mg

 noonish, 50 mg at bedtime.  Cannot titrate up some, but will be limited by her 

creatinine clearance.  She is due to have her stents replaced, this may improve 

and can titrate that up to 300 mg with a creatinine clearance of 30-60.  

Counseling provided regarding other options including opioids, patient like to 

hold off, but did discuss would be an option for a multimodal approach if needed

 in the future. 





2. Fatigue.  This is multifactorial, patient is pacing activities, adapting her 

work schedule, and staying active in the context of her activity tolerance and 

tachycardia.  Reviewed patient's nutrition and intake, patient has remained 

weight stable.  Did introduce possibility of Ritalin at low doses if needed to 

counter balance, particularly if develops side effects of sedation from 

medications.





3.  Recurrent metastatic breast CA with pelvic and bone mets.  Patient due for 

restaging scans in the near future, as well as stent replacement.  Counseling 

provided regarding some anticipatory guidance regarding natural progression of 

disease.  Patient is doing quite well currently, and responding with stable 

tumor markers.  Continuing of course to hope for the best, will be available if 

needs further support for decline.





4.  Advanced care planning.  Will let patient pace her comfort level regarding 

further exploration of advanced care planning documents and planning.  Patient 

is very much looking forward to her short-term goal of going [place] in May, 

recognizing this will be an important trip.





45 minutes total, with review of records, labs, scans, counseling provided 

regarding pain and symptom management, coordination of care with oncology team. 

 We will continue to check in with patient and titrate pain medications 

accordingly, and continue to offer support as patient accepts

## 2021-03-18 ENCOUNTER — HOSPITAL ENCOUNTER (OUTPATIENT)
Dept: HOSPITAL 76 - DI | Age: 61
Discharge: HOME | End: 2021-03-18
Attending: INTERNAL MEDICINE
Payer: COMMERCIAL

## 2021-03-18 DIAGNOSIS — R18.8: Primary | ICD-10-CM

## 2021-03-18 DIAGNOSIS — C79.51: ICD-10-CM

## 2021-03-18 DIAGNOSIS — N19: ICD-10-CM

## 2021-03-18 DIAGNOSIS — C79.89: ICD-10-CM

## 2021-03-18 DIAGNOSIS — Z85.3: ICD-10-CM

## 2021-03-18 LAB
APTT PPP: 34.8 SECS (ref 24.9–33.3)
INR PPP: 1.1 (ref 0.8–1.2)
PROTHROM ACT/NOR PPP: 12.1 SECS (ref 9.9–12.6)

## 2021-03-18 PROCEDURE — 36415 COLL VENOUS BLD VENIPUNCTURE: CPT

## 2021-03-18 PROCEDURE — 85730 THROMBOPLASTIN TIME PARTIAL: CPT

## 2021-03-18 PROCEDURE — 85610 PROTHROMBIN TIME: CPT

## 2021-03-18 NOTE — ULTRASOUND REPORT
PROCEDURE: Abdomen Limited

 

INDICATIONS:  Ascites

 

TECHNIQUE:  

Real-time focused scanning was performed of the abdomen, with image documentation.  

 

COMPARISON:  12/2/2020

 

FINDINGS:  Small volume ascites in all four quadrants.

 

IMPRESSION:  

Small volume ascites. Paracentesis was discussed with the patient and determined to be of little clin
ical benefit given the small volume. 

 

Reviewed by: Ronaldo Kevin MD on 3/18/2021 2:55 PM PDT

Approved by: Ronaldo Kevin MD on 3/18/2021 2:55 PM PDT

 

 

Station ID:  [id number]

## 2021-03-24 ENCOUNTER — HOSPITAL ENCOUNTER (OUTPATIENT)
Dept: HOSPITAL 76 - DI | Age: 61
Discharge: HOME | End: 2021-03-24
Attending: INTERNAL MEDICINE
Payer: COMMERCIAL

## 2021-03-24 DIAGNOSIS — C79.51: ICD-10-CM

## 2021-03-24 DIAGNOSIS — N13.30: ICD-10-CM

## 2021-03-24 DIAGNOSIS — R18.8: ICD-10-CM

## 2021-03-24 DIAGNOSIS — Z96.0: ICD-10-CM

## 2021-03-24 DIAGNOSIS — C78.6: ICD-10-CM

## 2021-03-24 DIAGNOSIS — N26.1: ICD-10-CM

## 2021-03-24 DIAGNOSIS — C50.919: Primary | ICD-10-CM

## 2021-03-24 LAB
CREAT SERPLBLD-SCNC: 1.8 MG/DL (ref 0.4–1)
GFRSERPLBLD MDRD-ARVRAT: 29 ML/MIN/{1.73_M2} (ref 89–?)

## 2021-03-24 PROCEDURE — 82565 ASSAY OF CREATININE: CPT

## 2021-03-24 PROCEDURE — 74176 CT ABD & PELVIS W/O CONTRAST: CPT

## 2021-03-24 PROCEDURE — 71250 CT THORAX DX C-: CPT

## 2021-03-24 PROCEDURE — 36415 COLL VENOUS BLD VENIPUNCTURE: CPT

## 2021-03-24 NOTE — CT REPORT
PROCEDURE:  CHEST WO

 

INDICATIONS:  Breast CA

 

TECHNIQUE: 

Noncontrast 5 mm thick sections acquired from the pulmonary apices to the posterior costophrenic angl
es.  7 mm thick coronal and sagittal MIP reformats were then acquired.  For radiation dose reduction,
 the following was used:  automated exposure control, adjustment of mA and/or kV according to patient
 size.

 

COMPARISON:  12/2/2020 and 7/28/2020 chest CT scanning.

 

FINDINGS:  

Image quality:  Excellent.  

 

Lungs and pleura:  No acute air space opacities.  No pleural effusions or pneumothorax.  Central and 
peripheral airways are patent and normal in caliber.  There is a calcified granuloma at the left lowe
r lobe

 

Mediastinum:  Heart size is normal.  No pericardial effusion.  No mediastinal adenopathy by size crit
eria.  Thoracic aorta and central pulmonary arteries are normal in size.  Esophagus is normal in cruz
bobby.  No hiatal hernia.  

 

Bones and chest wall: Diffusely osteosclerotic bone lesions, previously present..  No vertebral body 
compression fractures.  No axillary or supraclavicular adenopathy by size criteria.  The thyroid is n
ot well seen by this noncontrast technique. Note is made of a Port-A-Cath with tip in normal position
 from left-sided approach.

 

Abdomen:  Visualized upper abdominal solid organs and bowel loops appear normal in the absence of con
trast except at the kidneys where there appears to be a greater degree of right renal cortical atroph
y and a mild interval increase in previously present hydronephrosis on the left..  Also, a mild but d
efinite interval increase in ascites is noted within the perihepatic space. Calcific pancreatitis is 
again noted.

 

IMPRESSION:  

Metastatic disease involving the osseous elements of the axial and appendicular skeleton are again no
lisa, osteoblastic. Note also is made of a mild interval increase in upper abdominal ascites, etiology
 uncertain.

 

Note is made of mild worsening of renal cortical atrophy on the right and interval development of a m
oderate degree of left-sided hydronephrosis which was not present to the same degree on prior CT scan
shaun from 12/2/2020. Ureteral stents are not seen bilaterally within the small portion of the collect
ing system on this study. They are present below the field of view for scanning on the scanogram equi
valent of abdomen plain film imaging bilaterally. Malfunction of the left ureteral stent is suspected
.

 

Reviewed by: Johnnie Ta MD on 3/24/2021 3:35 PM PDT

Approved by: Johnnie Ta MD on 3/24/2021 3:35 PM PDT

 

 

Station ID:  [id number]

## 2021-03-25 NOTE — CT REPORT
PROCEDURE:  Abdomen/Pelvis WO

 

INDICATIONS:  Breast CA

 

TECHNIQUE:  

Noncontrast 5 mm thick sections acquired from the diaphragms to the symphysis.  5 mm coronal and sagi
ttal reformats were then performed.  For radiation dose reduction, the following was used:  automated
 exposure control, adjustment of mA and/or kV according to patient size. Oral contrast was utilized, 
intravenous contrast was not administered.

 

COMPARISON:  3/18/2021 ultrasound abdomen, chest CT 3/24/2021 also reviewed. Prior abdomen/pelvis CT 
12/2/2020 reviewed..

 

FINDINGS:  

Image quality:  Excellent.  

 

ABDOMEN:  

Lung bases:  Lung bases are clear.  Heart size is normal.  

 

Solid organs:  Liver and spleen are normal in size and given absence of intravenous contrast no focal
 liver or splenic lesion is seen. Perisplenic and perihepatic ascites is moderately increased, previo
usly measuring 7 mm in thickness along the hepatic border anteriorly in December of last year, and no
w measuring up to 1.8 cm..  Gallbladder has been previously resected.  Pancreas is stable in contours
 and scattered calcifications consistent with chronic calcific pancreatitis..  No adrenal nodules.  K
idneys are normal in size, without nephrolithiasis.  

 

During CT scanning 12/2/2020 bilateral ureteral stents were present in the degree of right-sided hydr
onephrosis has improved with the mid renal pelvis previously having measured up to 3.3 cm in maximal 
transverse dimension and currently measuring up to 2.2 cm. At the left kidney is a maximal AP and tra
nsverse dimension of hydronephrosis is 4.4 x 5.6 cm, and at a comparable area [facility] had measured 3.3 x 4.5 cm.

 

Peritoneum and bowel:  Unenhanced bowel loops demonstrate normal wall thickness and caliber.  No free
 fluid or air.  

 

Nodes and vessels:  No retroperitoneal or mesenteric adenopathy by size criteria.  Aorta and inferior
 vena cava are normal in caliber.  

 

Miscellaneous:  No ventral hernias.  

 

 

PELVIS:  

Genitourinary:  Bladder wall thickness is normal.  Note is made of a lobulated enlarged structure wit
hin the midline of the lower pelvis most likely multiple uterine fibroids enlarging the uterus and di
splacing the ureters peripherally, bilaterally, without appreciable change from December of last year
.

 

Miscellaneous:  No inguinal hernias or adenopathy.  

 

Bones:  No new suspicious bony lesions in this patient with previously documented extensive osteoblas
tic metastatic disease. No pathologic fracture found..  No vertebral body compression fractures.  

 

IMPRESSION:  

1. Interval increase in amount of ascites in the perihepatic space and to a lesser degree within the 
abdomen and pelvis more inferiorly. The amount of current ascites is small to moderate.

 

2. Improvement in hydronephrosis at the right kidney but worsening of hydronephrosis at the left kidn
ey in this patient with stable appearing bilateral ureteral stents. The right kidney is relatively at
rophic when compared to that on the left and therefore this finding is of potential significance for 
subsequent loss of renal function on the left. Consideration of stent malfunction is recommended for 
the left-sided ureteral stent. Replacement may be warranted.

 

3. 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
anning.

 

4. What is presumed to be the uterus is enlarged by a lobulated masses, which deviates the ureters an
d ureteral stents peripherally, bilaterally, stable over time from prior December 2020 CT scanning. P
resumed multiple uterine fibroids. An alternative etiology for presence of bilateral hydronephrosis p
reviously documented is not found.

   

 

Reviewed by: Johnnie Ta MD on 3/25/2021 10:08 AM PDT

Approved by: Johnnie Ta MD on 3/25/2021 10:08 AM PDT

 

 

Station ID:  [id number]

## 2021-04-11 ENCOUNTER — HOSPITAL ENCOUNTER (OUTPATIENT)
Dept: HOSPITAL 76 - LAB | Age: 61
Discharge: HOME | End: 2021-04-11
Attending: UROLOGY
Payer: COMMERCIAL

## 2021-04-11 DIAGNOSIS — Z20.822: ICD-10-CM

## 2021-04-11 DIAGNOSIS — N13.5: Primary | ICD-10-CM

## 2021-04-22 ENCOUNTER — HOSPITAL ENCOUNTER (OUTPATIENT)
Dept: HOSPITAL 76 - LAB | Age: 61
Discharge: HOME | End: 2021-04-22
Attending: PHYSICIAN ASSISTANT
Payer: COMMERCIAL

## 2021-04-22 ENCOUNTER — HOSPITAL ENCOUNTER (OUTPATIENT)
Dept: HOSPITAL 76 - PC | Age: 61
Discharge: HOME | End: 2021-04-22
Attending: NURSE PRACTITIONER
Payer: COMMERCIAL

## 2021-04-22 DIAGNOSIS — Z79.899: ICD-10-CM

## 2021-04-22 DIAGNOSIS — E11.9: ICD-10-CM

## 2021-04-22 DIAGNOSIS — C50.919: ICD-10-CM

## 2021-04-22 DIAGNOSIS — G89.3: ICD-10-CM

## 2021-04-22 DIAGNOSIS — D61.810: ICD-10-CM

## 2021-04-22 DIAGNOSIS — Z51.5: Primary | ICD-10-CM

## 2021-04-22 DIAGNOSIS — R53.83: ICD-10-CM

## 2021-04-22 DIAGNOSIS — T45.1X5A: ICD-10-CM

## 2021-04-22 DIAGNOSIS — E11.9: Primary | ICD-10-CM

## 2021-04-22 DIAGNOSIS — C79.89: ICD-10-CM

## 2021-04-22 DIAGNOSIS — Z96.0: ICD-10-CM

## 2021-04-22 DIAGNOSIS — Z79.84: ICD-10-CM

## 2021-04-22 DIAGNOSIS — C79.51: ICD-10-CM

## 2021-04-22 LAB
CHOLEST SERPL-MCNC: 180 MG/DL
EST. AVERAGE GLUCOSE BLD GHB EST-MCNC: 140 MG/DL (ref 70–100)
HBA1C MFR BLD HPLC: 6.5 % (ref 4.27–6.07)
HDLC SERPL-MCNC: 61 MG/DL
HDLC SERPL: 3 {RATIO} (ref ?–4.4)
LDLC SERPL CALC-MCNC: 99 MG/DL
LDLC/HDLC SERPL: 1.6 {RATIO} (ref ?–4.4)
TRIGL P FAST SERPL-MCNC: 102 MG/DL
VLDLC SERPL-SCNC: 20 MG/DL

## 2021-04-22 PROCEDURE — 83721 ASSAY OF BLOOD LIPOPROTEIN: CPT

## 2021-04-22 PROCEDURE — 99215 OFFICE O/P EST HI 40 MIN: CPT

## 2021-04-22 PROCEDURE — 36415 COLL VENOUS BLD VENIPUNCTURE: CPT

## 2021-04-22 PROCEDURE — 83036 HEMOGLOBIN GLYCOSYLATED A1C: CPT

## 2021-04-22 PROCEDURE — 80061 LIPID PANEL: CPT

## 2021-04-22 NOTE — CONSULTATION NOTE
Palliative Care Follow Up





- Referral


Referring Provider: Dr. Tani Ludwig


Time of Visit: 1030 45 minutes total


Referral setting: [facility]


Referral Reason: Pain of neoplastic origin/Recurrent Breast CA with pelvic /bone

mets





- Information Sources


Records reviewed: Previous records reviewed


History/Review of Systems obtained from: Patient


Exam limitations: No limitations





- History of Present Illness Update


Brief HPI Update: 


This is a ernesto 60-year-old woman who has recurrent stage IV breast cancer with

mets to the bone and pelvis, since 3/2020.  She is complaining of worsening 

abdominal pressure, known carcinomatosis, but on CT scan 3/24/2021 shows a small

amount of ascites in the perihepatic space and lesser within abdominal and 

pelvis, but is judged to be small to moderate without any indication for 

paracentesis.  She recently had her ureteral stents replaced, this is related to

hydronephrosis, which had increased in the left kidney, with some mild 

improvement in the right.  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 

multiple areas in her spine as well as pelvis and proximal femur.  She has been 

on Ibrance, at this point in time has had progression of disease.  She is going 

to [place] for a couple months, with plan to rotate back to [facility] when she 

returns.  She will continue on the Letrozole 2.5 mg, her pain medication, and 

focus on time with her family.





Patient's current regimen is pregabalin 75 mg 3 times daily, she has been 

holding the middle dose in hopes to not need a refill over in [place].  We did 

discuss ways to supplement this as well as could have Dr. Couch write for the 

prescription for [employer] if needed.  We also discussed adding some oxycodone 5 

mg 1 every 4 hours for worsening or uncontrolled pain while she is there.  As 

well as importance of continuing with soft regular bowel movements daily, given 

her "crowding" and abdominal pressure.  She does have early satiety, no nausea, 

and some persistent fatigue.





Past Medical History: 


History of stage IIIb left breast cancer IDC, bilateral mastectomy with left 

axillary lymph node dissection with adjuvant chemotherapy,Diabetes type 2 on 

glipizide, hydronephrosis with stent placement, and pancytopenia secondary to 

Ibrance.








Social History





- Living Situation


Living arrangement: At home


Living Situation: With spouse/s.o., With family


Support System: 


Patient lives with her ernesto  Stephen they have been  for 27 years,

they have 2 daughters.  Adalgisa is still trying to work, she will do some work 

from [place].  Her daughters will be joining them for a week for vacation.  She 

is looking forward to making memories, and celebrating her birthday








Medications/Allergies





- Medications


Home Medications: 


                                Ambulatory Orders











 Medication  Instructions  Recorded  Confirmed


 


Felodipine [Plendil] 10 mg PO DAILY 05/07/13 04/22/21


 


Glipizide [Glipizide Xl] 2.5 mg PO DAILY 05/06/20 04/22/21


 


Prochlorperazine Maleate 10 mg PO Q6HR PRN 05/12/20 04/22/21


 


ondansetron HCL [Zofran] 8 mg PO BID PRN 05/12/20 04/22/21


 


Atorvastatin [Lipitor] 10 mg PO DAILY 05/20/20 04/22/21


 


Pregabalin [Lyrica] 75 mg PO TID 03/04/21 04/22/21


 


Letrozole 2.5 mg PO DAILY 04/22/21 04/22/21


 


Oxycodone HCl [Roxicodone] 5 mg PO Q4HR PRN 04/22/21 04/22/21


 


polyethylene glycoL 3350 [Miralax] 17 gm PO DAILY PRN 04/22/21 04/22/21














- Allergies


Allergies/Adverse Reactions: 


                                    Allergies











Allergy/AdvReac Type Severity Reaction Status Date / Time


 


No Known Drug Allergies Allergy   Verified 03/31/21 13:09














Review of Systems





- Constitutional


Constitutional: reports: Fatigue (persistent).  denies: Fever, Chills





- Eyes


Eyes: reports: Vision loss





- Cardiovascular


Cardiovascular: reports: Exertional dyspnea, Decr. exercise tolerance





- Respiratory


Respiratory: reports: SOB with exertion.  denies: Cough, SOB at rest





- Gastrointestinal


Gastrointestinal: reports: Bloating, Early satiety.  denies: Abdominal pain, 

Constipation, Diarrhea, Nausea, Reflux/heartburn





- Musculoskeletal


Musculoskeletal: reports: Back pain, Muscle aches, Stiffness





- Integumentary


Integumentary: reports: Dryness





- Neurological


Neurological: reports: General weakness





- Endocrine


Endocrine: reports: Diabetes type 2 (4/22 aic 6.6)





- Hematologic/Lymphatic


Hematologic/Lymph: reports: Anemia (2/16 9.5 Hbg), Other





- All Other Systems


All Other Systems: reports: Reviewed and negative





Physical Exam





- Vital Signs


Respiratory Rate: 73


Blood Pressure: 115/74





- Physical Exam


General Appearance: positive: No acute distress, Alert


Eyes Bilateral: positive: Normal inspection, No scleral icterus


ENT: positive: No signs of dehydration


Neck: positive: Trachea midline


Cardiovascular: positive: Regular rate & rhythm


Respiratory: positive: Diminished in bases.  negative: No respiratory distress 

(breathlessness with activity), Wheezes


Abdomen: positive: Non-tender, Soft, Other (rounded)


Skin: positive: Dryness


Extremities: positive: Pedal edema (trace)


Neurologic/Psychiatric: positive: Oriented x3, Mood/affect nml





Palliative Care





- POLST


Patient has POLST: No


Pain: Pain unchanged, Location (right scapula/spinal colum; across lower back)


Tiredness/Fatigue: Moderate (4-6)


Drowsiness/Sedation: Mild (1-3)


Nausea: None


Anorexia: None


Dyspnea: Mild (1-3)


Depression: Mild (1-3)


Anxiety: None


Feelings of wellbeing/Perceived Quality of Life: Good, Acceptable


Sleep: Sleeps well


Constipation: Yes, Managed


Performance Status: 


Patient does have activity tolerance with increased shortness of breath but is 

still managing her ADLs, able to work modified schedule.  She is looking forward

 to [place], being able to be in the pool and relax.








- Palliative Care


Discussion: 


Discussion regarding pending trip, wanting to celebrate her birthday with her 

family, making memories.  Continues to have appropriate anxiety regarding living

 with serious illness, discussed different concepts regarding anticipatory 

guidance.  Psychosocial support provided, encouraged to continue with practical 

end-of-life planning, and continuing to hope for the best.  She feels her family

 is quite supportive, at this point time does not need further support or 

intervention.








Results





- Lab Results


Lab results reviewed: Yes


Lab and Imaging Results: 


Potassium 4.7, BUN 28, 1.5 creatinine, GFR 36, is her labs from 3/29; WBC 2.6, 

hemoglobin 9.5, hematocrit 29.7, CA 15-3 75








Impression and Recommendations





- Palliative Care


Impression: 


This is a ernesto 60-year-old woman with recurrent stage IV lobular breast cancer

 with mets to the bone and pelvic mass since 3/2020.  She is currently only on 

the leterozole 2.5 mg, she is off ibrance related to recent progression. She is 

expecting to restart treatment on her return from her vacation, she is currently

 managed on pregabalin 75 mg 3 times daily for her bone pain.  Palliative care 

to provide support regarding symptom management and anticipatory guidance.





Recommendations/Counseling Done: 


1. Pain of neoplastic origin.  Patient presents with multifactorial pain, 

attributed mostly to bone mets and peripheral neuropathy.  She is currently on 

pregabalin 75 mg 3 times daily, she has had her stents replaced, this may 

improve her creatinine clearance.  We can take titrate up to 300 mg with a 

creatinine clearance of 30-60.  Counseling provided regarding supplementation 

with opioids, oxycodone 5 mg 1 tab every 4 hours ordered. Provided instruction 

on a multimodal approach, bowel program if initiates oxycodone to increase 

MiraLAX.





2.  Fatigue.  This is multifactorial, patient is pacing activities, continued 

after work schedule.  Patient's has remained somewhat stable as far as weight.  

We will continue to monitor.  She is feeling somewhat better off the Ibrance 

with some increase in energy and overall sense of wellbeing.





3.  Recurrent metastatic breast cancer with pelvic and bone mets.  Patient did 

have her stents replaced, did have restaging scans with no significant worsening

 of visceral disease, but increased ascites and worsening tumor markers concern 

for possible progression.  Patient is weighing benefits and burdens of treatment

 options, will finalize decision when she returns.





4.  Advanced care planning.  Continuing let patient pace with her comfort level 

regarding exploration of end-of-life planning.  Conversation regarding tasks 

still needed to be completed, as well as feelings in the context of her decline 

in the future.





45 minutes with chart review, review of labs, review of scans, review of 

ureteral stent replacement notes, oncology notes face-to-face with counseling 

regarding pain and symptom management.

## 2021-07-01 ENCOUNTER — HOSPITAL ENCOUNTER (OUTPATIENT)
Dept: HOSPITAL 76 - DI | Age: 61
Discharge: HOME | End: 2021-07-01
Attending: INTERNAL MEDICINE
Payer: COMMERCIAL

## 2021-07-01 DIAGNOSIS — C79.51: ICD-10-CM

## 2021-07-01 DIAGNOSIS — R18.8: Primary | ICD-10-CM

## 2021-07-01 DIAGNOSIS — Z85.3: ICD-10-CM

## 2021-07-01 DIAGNOSIS — N19: ICD-10-CM

## 2021-07-01 DIAGNOSIS — C79.89: ICD-10-CM

## 2021-07-01 PROCEDURE — 49083 ABD PARACENTESIS W/IMAGING: CPT

## 2021-07-01 NOTE — ULTRASOUND REPORT
PROCEDURE:  Abdominal Paracentesis

 

INDICATIONS:  ASCITES

 

TECHNIQUE:  

The indications, alternatives, benefits, risks, and complications of the procedure were explained to 
the patient.  Written informed consent was obtained and placed in the chart.  The abdomen and pelvis 
were examined sonographically, and an appropriate site was chosen for paracentesis.  The skin was pre
pared and draped in the usual sterile fashion, and 1% lidocaine was infiltrated from the skin down th
rough the peritoneal surface.  A 19-gauge catheter-covered needle was then introduced into the perito
jovany space, the catheter was advanced and the needle was withdrawn, and thereafter peritoneal fluid w
as withdrawn.  The catheter was then removed and a dressing was applied.  The fluid was discarded if 
the clinician did not order diagnostic testing of the fluid.  

 

COMPARISON:  None

 

FINDINGS:  

Access site:  Right lower quadrant

Needle:  One-Step centesis catheter with introducer needle.  

Fluid volume and description:  5.1 L of clear yellowish ascites.

Fluid sent for diagnostic testing:  Requested

Medications:  1% lidocaine for local anaesthesia.  

Complications:  None.  

 

 

IMPRESSION:  

 

Successful ultrasound-guided paracentesis.  

 

Reviewed by: Susan Brewer MD, PhD on 7/1/2021 4:07 PM PDT

Approved by: Susan Brewer MD, PhD on 7/1/2021 4:07 PM PDT

 

 

Station ID:  [id number]

## 2021-07-08 ENCOUNTER — HOSPITAL ENCOUNTER (OUTPATIENT)
Dept: HOSPITAL 76 - PC | Age: 61
Discharge: HOME | End: 2021-07-08
Attending: NURSE PRACTITIONER
Payer: COMMERCIAL

## 2021-07-08 DIAGNOSIS — C79.89: ICD-10-CM

## 2021-07-08 DIAGNOSIS — C50.919: ICD-10-CM

## 2021-07-08 DIAGNOSIS — C79.51: ICD-10-CM

## 2021-07-08 DIAGNOSIS — F43.21: ICD-10-CM

## 2021-07-08 DIAGNOSIS — E11.42: ICD-10-CM

## 2021-07-08 DIAGNOSIS — Z79.84: ICD-10-CM

## 2021-07-08 DIAGNOSIS — G89.3: ICD-10-CM

## 2021-07-08 DIAGNOSIS — Z51.5: Primary | ICD-10-CM

## 2021-07-08 PROCEDURE — 99215 OFFICE O/P EST HI 40 MIN: CPT

## 2021-07-08 PROCEDURE — 99417 PROLNG OP E/M EACH 15 MIN: CPT

## 2021-07-08 NOTE — CONSULTATION NOTE
Palliative Care Follow Up





- Referral


Referring Provider: Dr. Tani Ludwig


Time of Visit: 0930 60 minutes


Referral setting: [facility]


Referral Reason: Pain of neoplastic origin/CIPN/Met Breast CA





- Information Sources


Records reviewed: RN notes reviewed, Previous records reviewed


History/Review of Systems obtained from: Patient, Family (Stephen )


Exam limitations: No limitations





- History of Present Illness Update


Brief HPI Update: 


This is a ernesto 61-year-old woman who has recurrent stage IV breast cancer with

mets to the bone and pelvis, since 3/2020.  She is recently been off treatment 

for the last 2 months, to be able to enjoy her trip to [place]. Unfortunately she

required 2 paracentesis while she was there, and another 1 on arrival back for 

5.1 L.  She does appear to be reaccumulating fairly quickly again, who she 

reports it has been more taut, but is with distention and discomfort.  She has 

had a fairly dramatic increase in her tumor markers, though this would be 

expected being of treatment, and has met with the oncologist.  The plan is to 

revisit Doxil, with the hope to decrease her tumor burden and improve her 

ascites.  She will continue her letrozole, her next paracentesis is planned for 

719, and she continues with compromised renal status related to her 

hydronephrosis.  She does have a pending abdominal CT scan for baseline 

restaging, but because of her renal status is unable to do it with contrast.  

This is scheduled for tomorrow.





Patient does have persistent peripheral neuropathy, pain radiating across her 

pelvis and spine as well as her lower scapular area.  She was actually much more

comfortable when she was snorkeling and able to be going into the water.  She 

had been able to decrease her pregabalin to 75 mg twice daily, and did not need 

to in use any oxycodone.  She continues to struggle with constipation, but is 

titrating her medications.  She does appear to have had some more muscle wasting

in appearance, but does appear quite vibrant.  She is here with her , 

both acknowledging coming back to reality and just though weight of dealing with

serious illness and the unknown.





Past Medical History: 


History of stage IIIb left cancer, IDC, bilateral mastectomy left axillary lymph

node dissection with adjuvant chemotherapy, diabetes type 2 on glipizide, 

hydronephrosis with stent placement, recurrent ascites.








Social History





- Living Situation


Living arrangement: At home


Living Situation: With spouse/s.o., With family


Support System: 


Patient lives with her ernesto  Stephen, they have been  for 27 

years, have 2 daughters who are living at home.  They very much appreciated 

their time in [place], and their daughters joined them at one point. Adalgisa is 

continue to work [employer], few days a week.  Stephen's work is accommodating so he 

can spend time supporting Adalgisa in her treatment, as well as being able to  

vacation. 








Medications/Allergies





- Medications


Home Medications: 


                                Ambulatory Orders











 Medication  Instructions  Recorded  Confirmed


 


Felodipine [Plendil] 10 mg PO DAILY 05/07/13 06/30/21


 


Glipizide [Glipizide Xl] 2.5 mg PO DAILY 05/06/20 06/30/21


 


Prochlorperazine Maleate 10 mg PO Q6HR PRN 05/12/20 06/30/21


 


ondansetron HCL [Zofran] 8 mg PO BID PRN 05/12/20 06/30/21


 


Atorvastatin [Lipitor] 10 mg PO DAILY 05/20/20 06/30/21


 


Pregabalin [Lyrica] 75 mg PO TID 03/04/21 06/30/21


 


Letrozole 2.5 mg PO DAILY 04/22/21 06/30/21


 


Oxycodone HCl [Roxicodone] 5 mg PO Q4HR PRN 04/22/21 06/30/21


 


polyethylene glycoL 3350 [Miralax] 17 gm PO DAILY PRN 04/22/21 06/30/21














- Allergies


Allergies/Adverse Reactions: 


                                    Allergies











Allergy/AdvReac Type Severity Reaction Status Date / Time


 


No Known Drug Allergies Allergy   Verified 06/30/21 10:12














Review of Systems





- Constitutional


Constitutional: reports: Fatigue, Weight gain (from fluid)





- Eyes


Eyes: reports: Vision loss





- Ears, Nose & Throat


Ears, Nose & Throat: reports: Hearing loss, Hearing aids





- Cardiovascular


Cardiovascular: reports: Exertional dyspnea, Decr. exercise tolerance





- Respiratory


Respiratory: reports: Cough (dry and more pronounced; attributes it to increased

abdominal pressure), SOB with exertion.  denies: Wheezing, SOB at rest





- Gastrointestinal


Gastrointestinal: reports: Abdominal distention, Constipation, Bloating, Early 

satiety, Other (requiring paracentesis again).  denies: Reflux/heartburn





- Genitourinary


Genitourinary: reports: Incontinence (with abdominal pressure)





- Musculoskeletal


Musculoskeletal: reports: Back pain, Muscle aches, Stiffness





- Integumentary


Integumentary: reports: Dryness





- Neurological


Neurological: reports: General weakness





- Endocrine


Endocrine: reports: Diabetes type 2 (4/22 aic 6.6)





- Hematologic/Lymphatic


Hematologic/Lymph: reports: Anemia (10.9)





- All Other Systems


All Other Systems: reports: Reviewed and negative





Physical Exam





- Vital Signs


Pulse Rate: 59


Respiratory Rate: 18


Blood Pressure: 110/58





- Physical Exam


General Appearance: positive: Alert, Mild distress


Eyes Bilateral: positive: Normal inspection


ENT: positive: No signs of dehydration


Neck: positive: Trachea midline


Cardiovascular: positive: Regular rate & rhythm


Respiratory: positive: Diminished in bases, Other (noted dry cough).  negative: 

Wheezes, Rales, Rhonchi


Abdomen: positive: Tenderness, Taut


Skin: positive: Dryness


Extremities: positive: Pedal edema (taut pedal edema up to knees)


Neurologic/Psychiatric: positive: Oriented x3, Mood/affect nml





Palliative Care





- POLST


Patient has POLST: No


POLST Status: Full Code


Pain: Pain unchanged, Pain improved (during vacation with buoyancy;), Location 

(see HPI), Severity (2/10)


Tiredness/Fatigue: Mild (1-3)


Drowsiness/Sedation: None


Nausea: None


Anorexia: Mild (1-3)


Dyspnea: Mild (1-3)


Depression: None


Anxiety: None


Feelings of wellbeing/Perceived Quality of Life: Good, Acceptable


Sleep: Sleeps well


Constipation: Yes, Intermittent constipation





- Palliative Care


Discussion: 


Both  and patient reflective of trip, recognizing it was a nice break, 

but also just the overshadowing of her serious illness with her recurrence of 

her ascites.  Very difficult to "ignore".  They have met with oncology, hopeful 

for response to the treatment, both for quality and quantity of life.  Both are 

feeling more vulnerable as this feels more realistic in the context of goal of 

treatment is palliative. Discussion regarding this, impact of grief and 

depressive feelings, as well as reflection on their relationship and supportive 

each other.  They are getting some support further daughters through grief 

counseling, and wanting them to take care of themselves as well.  Just 

acknowledged the difficulty of being in "the twilight zone" with many unknowns 

but aware of seriousness of her illness and current condition. 





Results





- Lab Results


Lab results reviewed: Yes





Impression and Recommendations





- Palliative Care


Impression: 


This is a ernesto 61-year-old woman with recurrent stage IV lobular breast 

cancer, with mets to the bone and pelvic mass since 3/2020.  She is currently 

restarting Doxil, and continuing letrozole 2.5 mg daily.  She is managing her 

pain on pregabalin 75 mg twice daily, but unfortunately is having recurrent 

ascites.  Palliative care to provide support regarding symptom management, 

psychosocial support, and anticipatory guidance.





Recommendations/Counseling Done: 


1. Pain of neoplastic origin.  Patient presents with multifactorial pain, a

ttributed mostly to bone mets and peripheral neuropathy.  She was able to 

decrease her pregabalin to twice daily when on vacation, secondary to the time 

spent in the water and buoyancy.  Counseling provided May need to increase to 75

mg 3 times daily again, will go ahead and order new prescription to accommodate 

this.





2.  Recurrent metastatic breast cancer with pelvic and bone mets.  Patient is d

ue for restaging scans tomorrow, unfortunately given her kidney function unable 

to use contrast.  She does have worsening tumor markers, and concern regarding 

recurrent ascites.  Patient is not scheduled for tap until 7/19, she does appear

quite uncomfortable, recommended she reach out if she would like this scheduled 

elsewhere.  [facility] cannot accommodate until then.  Patient continues weig

h benefits and burdens of treatment options, will be starting Doxil today, she 

is familiar with this medication and side effects.





3.  Grief.  Provided support for her  and patient given the seriousness 

of her illness, and worsening of disease.  Provided active listening, and 

support for struggles with the implications and ramifications of their current 

situation.  They do have a very close family,  and patient very dependent

on each other, have supportive community, encouraged to continue to find ways to

stay in the moment and access support.





4.  Advanced care planning.  We will continue outpatient and  pace with 

her comfort level regarding exploration of planning into the future.  They do 

feel very good about the trip they had, patient continues to weigh in the cont

ext of quality of life benefits and burdens of treatment decisions and 

supportive care.





60 minutes with review of oncology notes, labs, imaging, face-to-face with 

review and counseling regarding symptoms, psychosocial support, and anticipatory

guidance

## 2021-07-09 ENCOUNTER — HOSPITAL ENCOUNTER (OUTPATIENT)
Dept: HOSPITAL 76 - DI | Age: 61
Discharge: HOME | End: 2021-07-09
Attending: INTERNAL MEDICINE
Payer: COMMERCIAL

## 2021-07-09 DIAGNOSIS — C79.51: ICD-10-CM

## 2021-07-09 DIAGNOSIS — N26.1: ICD-10-CM

## 2021-07-09 DIAGNOSIS — C78.6: Primary | ICD-10-CM

## 2021-07-09 DIAGNOSIS — R18.8: ICD-10-CM

## 2021-07-09 DIAGNOSIS — K86.89: ICD-10-CM

## 2021-07-09 DIAGNOSIS — N13.30: ICD-10-CM

## 2021-07-09 NOTE — CT REPORT
PROCEDURE:  Abdomen/Pelvis WO

 

INDICATIONS:  BONE CA

 

TECHNIQUE:  

Noncontrast 5 mm thick sections acquired from the diaphragms to the symphysis.  5 mm coronal and sagi
ttal reformats were then performed.  For radiation dose reduction, the following was used:  automated
 exposure control, adjustment of mA and/or kV according to patient size.

 

COMPARISON:  CT abdomen and pelvis without contrast, 3/24/2021. CT chest without contrast and CT abdo
men and pelvis without contrast the chest without contrast and, 12/02/2020.

 

FINDINGS:  

Image quality:  Excellent.  

 

ABDOMEN:  

Lung bases:  Lung bases are clear.  Heart size is normal.  Small hiatal hernia.

 

Solid organs:  Liver and spleen are normal in size.  Gallbladder is surgically absent. Punctate calci
fication of pancreas consistent with chronic pancreatitis. No adrenal nodules.  

 

The right kidney is severely atrophic. There is moderate left hydronephrosis and mild right hydroneph
rosis. Ureteral stents are present bilaterally. No  nephrolithiasis.  

 

Peritoneum and bowel:  There is a large amount of ascites, increased compared to the last exam. Unenh
anced bowel loops demonstrate normal wall thickness and caliber.  No free air.  

 

Nodes and vessels:  CT chest without contrast, 12/02/2020. No retroperitoneal or mesenteric adenopath
y by size criteria.  Aorta and inferior vena cava are normal in caliber.  

 

Miscellaneous:  No ventral hernias.  

 

 

PELVIS:  

Genitourinary:  Bladder wall thickness is normal.  There is a large conglomerate pelvic mass measurin
g 9.4 cm AP, 10.0 cm transverse and 1.7 cm cephalocaudal, not significantly changed in size when comp
ared to the last exam.

 

Miscellaneous:  No inguinal hernias or adenopathy.  

 

Bones: Diffuse sclerotic bone lesions consistent with extensive osseous metastasis. Overall, there is
 no significant change.  No pathological vertebral body compression fractures.  

 

IMPRESSION:  

 

1. There is a large amount of ascites, increased compared with the last exam.

2. A large conglomerate pelvic mass, not significantly changed in size since the last exam.

3. Extensive lytic osseous metastases.

4. Punctate calcifications of pancreas consistent with chronic pancreatitis.

5. Moderate left and mild right hydronephrosis. Ureteral stents are present bilaterally.

6. Severe right renal atrophy.

 

Reviewed by: Sim Gorman MD on 7/9/2021 2:47 PM PDT

Approved by: Sim Gorman MD on 7/9/2021 2:47 PM PDT

 

 

Station ID:  [id number]

## 2021-07-09 NOTE — CT REPORT
PROCEDURE:  CHEST WO

 

INDICATIONS:  BONE CA

 

TECHNIQUE: 

Noncontrast 5 mm thick sections acquired from the pulmonary apices to the posterior costophrenic angl
es.  7 mm thick coronal and sagittal MIP reformats were then acquired.  For radiation dose reduction,
 the following was used:  automated exposure control, adjustment of mA and/or kV according to patient
 size.

 

COMPARISON:     

 

FINDINGS:  

Image quality:  Excellent.  

 

Lungs and pleura:  No acute air space opacities.  No pleural effusions or pneumothorax.  Central and 
peripheral airways are patent and normal in caliber.  There is pleural scarring of the left upper lob
e anteriorly. There is a calcified granuloma in the left lower lobe.

 

Mediastinum:  Heart size is normal.  No pericardial effusion.  No mediastinal adenopathy by size crit
eria.  Thoracic aorta and central pulmonary arteries are normal in size.  Esophagus is normal in cruz
bobby.  No hiatal hernia.  

 

Bones and chest wall: Diffuse osteosclerotic bone lesions are unchanged compared to the prior CT. No 
vertebral body compression fracture. No axillary or supraclavicular adenopathy by size criteria. The 
thyroid gland is normal. There is a left chest wall Port-A-Cath.

 

Abdomen: A large amount of ascites is present. The liver and spleen have a grossly normal appearance.
 The left kidney is partially visualized and demonstrates hydronephrosis.

 

IMPRESSION:  

1. Sclerotic osteoblastic metastatic disease to the visualized bones including the spine, ribs, and s
ternum; unchanged compared to prior CT on 3/24/2021.

2. No interval development of mass or adenopathy.

3. Ascites and left hydronephrosis. Please see separately dictated CT of the abdomen and pelvis.

 

Reviewed by: Kimani Ferraro on 7/9/2021 2:01 PM PDT

Approved by: Kimani Ferraro on 7/9/2021 2:01 PM PDT

 

 

Station ID:  [id number]

## 2021-07-19 ENCOUNTER — HOSPITAL ENCOUNTER (OUTPATIENT)
Dept: HOSPITAL 76 - LAB | Age: 61
Discharge: HOME | End: 2021-07-19
Attending: INTERNAL MEDICINE
Payer: COMMERCIAL

## 2021-07-19 DIAGNOSIS — Z85.3: ICD-10-CM

## 2021-07-19 DIAGNOSIS — N19: ICD-10-CM

## 2021-07-19 DIAGNOSIS — C79.51: ICD-10-CM

## 2021-07-19 DIAGNOSIS — C79.89: Primary | ICD-10-CM

## 2021-07-19 LAB
APTT PPP: 37 SECS (ref 24.9–33.3)
INR PPP: 1.2 (ref 0.8–1.2)
PROTHROM ACT/NOR PPP: 13.7 SECS (ref 9.9–12.6)

## 2021-07-19 PROCEDURE — 85610 PROTHROMBIN TIME: CPT

## 2021-07-19 PROCEDURE — 36415 COLL VENOUS BLD VENIPUNCTURE: CPT

## 2021-07-19 PROCEDURE — 49083 ABD PARACENTESIS W/IMAGING: CPT

## 2021-07-19 PROCEDURE — 85730 THROMBOPLASTIN TIME PARTIAL: CPT

## 2021-07-19 NOTE — ULTRASOUND REPORT
PROCEDURE:  Abdominal Paracentesis

 

INDICATIONS:  ASCITES

 

TECHNIQUE:  

The indications, alternatives, benefits, risks, and complications of the procedure were explained to 
the patient.  Written informed consent was obtained and placed in the chart.  The abdomen and pelvis 
were examined sonographically, and an appropriate site was chosen for paracentesis.  The skin was pre
pared and draped in the usual sterile fashion, and 1% lidocaine was infiltrated from the skin down th
rough the peritoneal surface.  A 19-gauge catheter-covered needle was then introduced into the perito
jovany space, the catheter was advanced and the needle was withdrawn, and thereafter peritoneal fluid w
as withdrawn.  The catheter was then removed and a dressing was applied.  The fluid was discarded if 
the clinician did not order diagnostic testing of the fluid.  

 

COMPARISON:     

 

FINDINGS:  

Access site:  Right lower quadrant

Needle:  One-Step centesis catheter with introducer needle.  

Fluid volume and description:  6100 cc clear

Fluid sent for diagnostic testing:  None

Medications:  1% lidocaine for local anaesthesia.  

Complications:  None.  

 

IMPRESSION:  

    Successful ultrasound-guided paracentesis.  

 

Reviewed by: Margaret Bales MD on 7/19/2021 3:16 PM PDT

Approved by: Margaret Bales MD on 7/19/2021 3:16 PM PDT

 

 

Station ID:  [id number]

## 2021-08-02 ENCOUNTER — HOSPITAL ENCOUNTER (OUTPATIENT)
Dept: HOSPITAL 76 - DI | Age: 61
Discharge: HOME | End: 2021-08-02
Attending: INTERNAL MEDICINE
Payer: COMMERCIAL

## 2021-08-02 DIAGNOSIS — C79.51: ICD-10-CM

## 2021-08-02 DIAGNOSIS — C78.6: Primary | ICD-10-CM

## 2021-08-02 DIAGNOSIS — C50.919: ICD-10-CM

## 2021-08-02 DIAGNOSIS — D63.1: ICD-10-CM

## 2021-08-02 DIAGNOSIS — R18.0: ICD-10-CM

## 2021-08-02 PROCEDURE — 49083 ABD PARACENTESIS W/IMAGING: CPT

## 2021-08-02 NOTE — ULTRASOUND REPORT
PROCEDURE:  Abdominal Paracentesis

 

INDICATIONS:  ASCITES, BREAST CA

 

TECHNIQUE:  

The indications, alternatives, benefits, risks, and complications of the procedure were explained to 
the patient.  Written informed consent was obtained and placed in the chart.  The abdomen and pelvis 
were examined sonographically, and an appropriate site was chosen for paracentesis.  The skin was pre
pared and draped in the usual sterile fashion, and 1% lidocaine was infiltrated from the skin down th
rough the peritoneal surface.  A 19-gauge catheter-covered needle was then introduced into the perito
jovany space, the catheter was advanced and the needle was withdrawn, and thereafter peritoneal fluid w
as withdrawn.  The catheter was then removed and a dressing was applied.  The fluid was discarded if 
the clinician did not order diagnostic testing of the fluid.  

 

COMPARISON:  None

 

FINDINGS:  

Access site:  Right lower quadrant

Needle:  One-Step centesis catheter with introducer needle.  

Fluid volume and description:  4.5 L of yellowish clear fluid.

Fluid sent for diagnostic testing:  Not requested

Medications:  1% lidocaine for local anaesthesia.  

Complications:  None.  

 

IMPRESSION:   Successful ultrasound-guided paracentesis. No immediate complications. 

 

Reviewed by: Susan Brewer MD, PhD on 8/2/2021 10:41 AM PDT

Approved by: Susan Brewer MD, PhD on 8/2/2021 10:41 AM PDT

 

 

Station ID:  [id number]

## 2021-08-04 NOTE — ONCOLOGY FOLLOW UP
Oncology Follow-Up: 





 ONCOLOGY HISTORY:


1.  -Recurrent stage IV, ER/ND positive, HER-2 negative breast cancer with 

metastases to bone and pelvis since 3/2028


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


-Bilateral mastectomy, left axillary lymph node dissection, adjuvant 

chemotherapy with AC/T, cycle 12 Taxol discontinued due to neuropathy, status 

post adjuvant XRT to left chest wall completed 7/20/2013


-Aromasin for 1 year, but no continuation of therapy after this due to unclear 

reasons


-Presented with peritoneal carcinomatosis and ascites with pelvic mass on CT on 

3/2020


-Biopsy pelvic mass positive for primary breast cancer, ER +99%, ND +9 9%, HER-2

negative


-Cycle 1 day 1 Doxil 40 mg per meter squared 5/12/2020-Cycle 2 with increased 

Doxil 50 gonzalo per meter squared


-Completed cycle 4 of Doxil on 8/5/2020


-Letrozole 2.5 mg started 8/5/2020


-Dose reduction of letrozole 2.5 mg to every other day to 2 increasing diarrhea 

while on letrozole


-Switched letrozole to anastrozole on 9/2/2020 for better symptom toleration


-C1D1 palboclclib 125 mg D1-21 q28 D on 9/2/2020


-dose reduction palbo to 100 mg  on 10/9/2020 due to neutropaenia 


-Dose reduction of Palbociclib to 75 mg due to persistent neutropenia


-Palbociclib stopped 3/31/2021 due to rising tumor markers and worsening clinica

l symptoms


-Treatment hiatus from 3/31/2021 to 6/16/2021


-Interval restaging scan 7/9/2021


- cycle 1 day 1 of Doxil at 50 mg per metered squared 7/8/2021








HISTORY OF CURRENT ILLNESS/REVIEW OF SYSTEMS:


Patient returns for ongoing follow-up





Since last seen the patient continues to have ongoing symptoms of recurrent 

ascites that have made it hard for her to sleep and she has developed a cough 

since having reaccumulation of ascites





She had drainage this this past Monday and has already started to reaccumulate 

fluid with abdominal distention





She remains hopeful that her Doxil will eventually start working





She continues to work, managing her ADLs and IADLs, No fevers or chills





Review of systems: A complete review of systems was otherwise negative in detail

except per HPI





FAMILY/SOCIAL HISTORY:


Reviewed per initial consultation note dated 5/6/2020 with no changes today








PHYSICAL EXAM:


Vital signs: Reviewed per chart


GEN: AAOX3, NAD


HEENT: EOMI MMM, no thrush


LYMPH: No cervical or supraclavicular lymphadenopathy 


Cards: Regular rate and rhythm


Abdomen: Appears Significantly distended, Nontender to palpation


Extremities: No extremity edema noted


Skin: No bruises or rash 


Neuro: No focal neurological deficits


Psych: Appropriately conversant, normal affect 








LABS: 


 Available labs reviewed





IMAGING


Available imaging reviewed





ASSESSMENT/PLAN:


1. Widely metastatic breast cancer with diffuse bony metastases-The patient's 

tumor markers continue to increase however we did note that with her original 

round of Doxil it did take several months to see a response and initially her 

tumor markers increase before they started decreasing at the 3-month gema.  We 

remain concerned however over the more aggressive reaccumulation of her 

malignant abdominal ascites


 


-Continue with Doxil at 50 mg/m as previously received as she did tolerate this

and there was no evidence of disease progression while on treatment, and also 

discussed recommendation of this of this over everolimus due to her visceral 

metastases


-continue letrozole given ER positive status


-We will need to consider addition of Neulasta given mild leukopenia seen on 

labs today, However okay to proceed with next cycle of Doxil as is


-Would check tumor markers with each 28-day cycle of Doxil


-We will need to repeat echocardiogram within the next 3 to 4 weeks, Ordered 

today


 


4. Extensive bony metastatic disease-Previously discussed benefits of Zometa 

given extensive osseous metastatic disease however patient had concerns over 

osteonecrosis of the job, And wishes to defer at this time


-Defer Zometa at this time per patient preference





5.  Malignant ascites-Clinical exam with worsening ascites, and tumor markers 

likely with worsening disease Discussed hopes of improvement as chemotherapy 

proceeds


-Continue with paracentesis prn





6.Subacute renal failure secondary to hydronephrosis from tumor compression-

Mildly worsened, may be in the setting of worsening hydronephrosis


-Continue follow-up with urology and PCP





 








Clinical Data: 


                                    Allergies





No Known Drug Allergies Allergy (Verified 07/14/21 10:13)


   





                                Home Medications





Felodipine [Plendil] 10 mg PO DAILY 05/07/13 [History Last Taken 07/06/20]


Glipizide [Glipizide Xl] 2.5 mg PO DAILY 05/06/20 [History Last Taken 07/06/20]


Prochlorperazine Maleate 10 mg PO Q6HR PRN 05/12/20 [History Last Taken Unknown]


ondansetron HCL [Zofran] 8 mg PO BID PRN 05/12/20 [History Last Taken Unknown]


Atorvastatin [Lipitor] 10 mg PO DAILY 05/20/20 [History Last Taken 07/06/20]


Pregabalin [Lyrica] 75 mg PO TID 03/04/21 [History Last Taken Unknown]


Letrozole 2.5 mg PO DAILY 04/22/21 [History Last Taken Unknown]


Oxycodone HCl [Roxicodone] 5 mg PO Q4HR PRN 04/22/21 [History Last Taken 

Unknown]


polyethylene glycoL 3350 [Miralax] 17 gm PO DAILY PRN 04/22/21 [History Last 

Taken Unknown]

## 2021-08-16 ENCOUNTER — HOSPITAL ENCOUNTER (OUTPATIENT)
Dept: HOSPITAL 76 - DI | Age: 61
Discharge: HOME | End: 2021-08-16
Attending: INTERNAL MEDICINE
Payer: COMMERCIAL

## 2021-08-16 DIAGNOSIS — C79.51: Primary | ICD-10-CM

## 2021-08-16 DIAGNOSIS — C78.6: ICD-10-CM

## 2021-08-16 DIAGNOSIS — D63.1: ICD-10-CM

## 2021-08-16 DIAGNOSIS — R18.0: ICD-10-CM

## 2021-08-16 PROCEDURE — 49083 ABD PARACENTESIS W/IMAGING: CPT

## 2021-08-16 NOTE — ULTRASOUND REPORT
PROCEDURE:  Abdominal Paracentesis

 

INDICATIONS:  ASCITES, BREAST CANCER

 

TECHNIQUE:  

The indications, alternatives, benefits, risks, and complications of the procedure were explained to 
the patient.  Written informed consent was obtained and placed in the chart.  The abdomen and pelvis 
were examined sonographically, and an appropriate site was chosen for paracentesis.  The skin was pre
pared and draped in the usual sterile fashion, and 1% lidocaine was infiltrated from the skin down th
rough the peritoneal surface.  A 19-gauge catheter-covered needle was then introduced into the perito
jovany space, the catheter was advanced and the needle was withdrawn, and thereafter peritoneal fluid w
as withdrawn.  The catheter was then removed and a dressing was applied.  The fluid was discarded if 
the clinician did not order diagnostic testing of the fluid.  

 

COMPARISON:  8/2/2021

 

FINDINGS:  

Access site:  Right lower quadrant abdomen

Needle:  One-Step centesis catheter with introducer needle.  

Fluid volume and description:  6.5 L of clear straw-colored fluid.

Fluid sent for diagnostic testing:  None

Medications:  1% lidocaine for local anaesthesia.  

Complications:  None.  

 

IMPRESSION:  

Successful ultrasound-guided paracentesis.  

 

Reviewed by: Al Paredes MD on 8/16/2021 1:29 PM PDT

Approved by: Al Paredes MD on 8/16/2021 1:29 PM PDT

 

 

Station ID:  [id number]

## 2021-08-18 ENCOUNTER — HOSPITAL ENCOUNTER (OUTPATIENT)
Dept: HOSPITAL 76 - DI | Age: 61
Discharge: HOME | End: 2021-08-18
Attending: INTERNAL MEDICINE
Payer: COMMERCIAL

## 2021-08-18 DIAGNOSIS — C79.51: ICD-10-CM

## 2021-08-18 DIAGNOSIS — C78.6: Primary | ICD-10-CM

## 2021-08-18 DIAGNOSIS — Z79.899: ICD-10-CM

## 2021-08-18 DIAGNOSIS — R18.8: ICD-10-CM

## 2021-08-18 PROCEDURE — 93306 TTE W/DOPPLER COMPLETE: CPT

## 2021-08-30 ENCOUNTER — HOSPITAL ENCOUNTER (OUTPATIENT)
Dept: HOSPITAL 76 - DI | Age: 61
Discharge: HOME | End: 2021-08-30
Attending: INTERNAL MEDICINE
Payer: COMMERCIAL

## 2021-08-30 DIAGNOSIS — C79.51: ICD-10-CM

## 2021-08-30 DIAGNOSIS — R18.0: ICD-10-CM

## 2021-08-30 DIAGNOSIS — C78.6: Primary | ICD-10-CM

## 2021-08-30 DIAGNOSIS — D63.1: ICD-10-CM

## 2021-08-30 PROCEDURE — 49083 ABD PARACENTESIS W/IMAGING: CPT

## 2021-08-30 NOTE — ULTRASOUND REPORT
PROCEDURE:  Abdominal Paracentesis

 

INDICATIONS:  ASCITES, BREAST CANCER

 

TECHNIQUE:  

The indications, alternatives, benefits, risks, and complications of the procedure were explained to 
the patient.  Written informed consent was obtained and placed in the chart.  The abdomen and pelvis 
were examined sonographically, and an appropriate site was chosen for paracentesis.  The skin was pre
pared and draped in the usual sterile fashion, and 1% lidocaine was infiltrated from the skin down th
rough the peritoneal surface.  A 19-gauge catheter-covered needle was then introduced into the perito
jovany space, the catheter was advanced and the needle was withdrawn, and thereafter peritoneal fluid w
as withdrawn.  The catheter was then removed and a dressing was applied.  The fluid was discarded if 
the clinician did not order diagnostic testing of the fluid.  

 

COMPARISON:  8/16/2021

 

FINDINGS:  

Access site:  Right lower quadrant

Needle:  One-Step centesis catheter with introducer needle.  

Fluid volume and description:  5.7 L of thin, yellow fluid

Fluid sent for diagnostic testing:  No. Therapeutic thoracentesis only. Fluid discarded.

Medications:  1% lidocaine for local anaesthesia.  

Complications:  None.  

 

IMPRESSION:  

Technically successful ultrasound-guided paracentesis.  

 

Reviewed by: Jayce Barajas MD on 8/30/2021 10:08 AM PDT

Approved by: Jayce Barajas MD on 8/30/2021 10:08 AM PDT

 

 

Station ID:  [id number]

## 2021-09-13 ENCOUNTER — HOSPITAL ENCOUNTER (OUTPATIENT)
Dept: HOSPITAL 76 - DI | Age: 61
Discharge: HOME | End: 2021-09-13
Attending: INTERNAL MEDICINE
Payer: COMMERCIAL

## 2021-09-13 DIAGNOSIS — R18.0: ICD-10-CM

## 2021-09-13 DIAGNOSIS — C79.81: Primary | ICD-10-CM

## 2021-09-13 DIAGNOSIS — C78.6: ICD-10-CM

## 2021-09-13 DIAGNOSIS — D63.1: ICD-10-CM

## 2021-09-13 PROCEDURE — 49083 ABD PARACENTESIS W/IMAGING: CPT

## 2021-09-13 NOTE — ULTRASOUND REPORT
PROCEDURE:  Abdominal Paracentesis

 

INDICATIONS:  ASCITES, BREAST CANCER

 

TECHNIQUE:  

The indications, alternatives, benefits, risks, and complications of the procedure were explained to 
the patient.  Written informed consent was obtained and placed in the chart.  The abdomen and pelvis 
were examined sonographically, and an appropriate site was chosen for paracentesis.  The skin was pre
pared and draped in the usual sterile fashion, and 1% lidocaine was infiltrated from the skin down th
rough the peritoneal surface.  A 19-gauge catheter-covered needle was then introduced into the perito
jovany space, the catheter was advanced and the needle was withdrawn, and thereafter peritoneal fluid w
as withdrawn.  The catheter was then removed and a dressing was applied.  The fluid was discarded if 
the clinician did not order diagnostic testing of the fluid.  

 

COMPARISON:  None

 

FINDINGS:  

Access site:  Right lower quadrant

Needle:  One-Step centesis catheter with introducer needle.  

Fluid volume and description:  5.8L clear yellow

Fluid sent for diagnostic testing:  None

Medications:  1% lidocaine for local anaesthesia.  

Complications:  None.  

 

IMPRESSION:  

    Successful ultrasound-guided paracentesis.  

 

Reviewed by: Margaret Bales MD on 9/13/2021 3:03 PM PDT

Approved by: Margaret Bales MD on 9/13/2021 3:03 PM PDT

 

 

Station ID:  [id number]

## 2021-09-27 ENCOUNTER — HOSPITAL ENCOUNTER (OUTPATIENT)
Dept: HOSPITAL 76 - LAB | Age: 61
Discharge: HOME | End: 2021-09-27
Attending: INTERNAL MEDICINE
Payer: COMMERCIAL

## 2021-09-27 ENCOUNTER — HOSPITAL ENCOUNTER (OUTPATIENT)
Dept: HOSPITAL 76 - DI | Age: 61
Discharge: HOME | End: 2021-09-27
Attending: INTERNAL MEDICINE
Payer: COMMERCIAL

## 2021-09-27 DIAGNOSIS — C79.51: Primary | ICD-10-CM

## 2021-09-27 DIAGNOSIS — C50.919: ICD-10-CM

## 2021-09-27 DIAGNOSIS — D63.1: ICD-10-CM

## 2021-09-27 DIAGNOSIS — C78.6: ICD-10-CM

## 2021-09-27 DIAGNOSIS — N18.9: ICD-10-CM

## 2021-09-27 DIAGNOSIS — Z01.812: ICD-10-CM

## 2021-09-27 DIAGNOSIS — R18.0: ICD-10-CM

## 2021-09-27 DIAGNOSIS — Z01.812: Primary | ICD-10-CM

## 2021-09-27 LAB
APTT PPP: 36.2 SECS (ref 24.9–33.3)
INR PPP: 1.3 (ref 0.8–1.2)
PROTHROM ACT/NOR PPP: 14.9 SECS (ref 9.9–12.6)

## 2021-09-27 PROCEDURE — 36415 COLL VENOUS BLD VENIPUNCTURE: CPT

## 2021-09-27 PROCEDURE — 85610 PROTHROMBIN TIME: CPT

## 2021-09-27 PROCEDURE — 85730 THROMBOPLASTIN TIME PARTIAL: CPT

## 2021-09-27 PROCEDURE — 49083 ABD PARACENTESIS W/IMAGING: CPT

## 2021-09-27 NOTE — ULTRASOUND REPORT
PROCEDURE:  Abdominal Paracentesis

 

INDICATIONS:  ASCITES, BREAST CANCER

 

TECHNIQUE:  

The indications, alternatives, benefits, risks, and complications of the procedure were explained to 
the patient.  Written informed consent was obtained and placed in the chart.  The abdomen and pelvis 
were examined sonographically, and an appropriate site was chosen for paracentesis.  The skin was pre
pared and draped in the usual sterile fashion, and 1% lidocaine was infiltrated from the skin down th
rough the peritoneal surface.  A 19-gauge catheter-covered needle was then introduced into the perito
jovany space, the catheter was advanced and the needle was withdrawn, and thereafter peritoneal fluid w
as withdrawn.  The catheter was then removed and a dressing was applied.  The fluid was discarded if 
the clinician did not order diagnostic testing of the fluid.  

 

COMPARISON:     

 

FINDINGS:  

Access site:  Right lower quadrant

Needle:  One-Step centesis catheter with introducer needle.  

Fluid volume and description:  5.1 L yellow

Fluid sent for diagnostic testing:  No

Medications:  1% lidocaine for local anaesthesia.  

Complications:  None.  

 

IMPRESSION:  

    Successful ultrasound-guided paracentesis.  

 

Reviewed by: Margaret Bales MD on 9/27/2021 11:45 AM PDT

Approved by: Margaret Bales MD on 9/27/2021 11:45 AM PDT

 

 

Station ID:  [id number]

## 2021-10-05 ENCOUNTER — HOSPITAL ENCOUNTER (OUTPATIENT)
Dept: HOSPITAL 76 - LAB | Age: 61
Discharge: HOME | End: 2021-10-05
Attending: UROLOGY
Payer: COMMERCIAL

## 2021-10-05 DIAGNOSIS — Z96.0: Primary | ICD-10-CM

## 2021-10-05 PROCEDURE — 87086 URINE CULTURE/COLONY COUNT: CPT

## 2021-10-06 ENCOUNTER — HOSPITAL ENCOUNTER (OUTPATIENT)
Dept: HOSPITAL 76 - PC | Age: 61
Discharge: HOME | End: 2021-10-06
Attending: NURSE PRACTITIONER
Payer: COMMERCIAL

## 2021-10-06 DIAGNOSIS — H54.7: ICD-10-CM

## 2021-10-06 DIAGNOSIS — E11.51: ICD-10-CM

## 2021-10-06 DIAGNOSIS — R18.0: ICD-10-CM

## 2021-10-06 DIAGNOSIS — C50.919: ICD-10-CM

## 2021-10-06 DIAGNOSIS — C78.6: ICD-10-CM

## 2021-10-06 DIAGNOSIS — Z51.5: Primary | ICD-10-CM

## 2021-10-06 DIAGNOSIS — G62.9: ICD-10-CM

## 2021-10-06 DIAGNOSIS — L97.909: ICD-10-CM

## 2021-10-06 DIAGNOSIS — G89.3: ICD-10-CM

## 2021-10-06 DIAGNOSIS — C79.51: ICD-10-CM

## 2021-10-06 DIAGNOSIS — Z79.84: ICD-10-CM

## 2021-10-06 DIAGNOSIS — Z79.899: ICD-10-CM

## 2021-10-06 PROCEDURE — 99215 OFFICE O/P EST HI 40 MIN: CPT

## 2021-10-06 NOTE — CONSULTATION NOTE
Palliative Care Follow Up





- Referral


Referring Provider: Dr. Tani Ludwig


Time of Visit: 1030 45 min


Referral setting: [facility]


Referral Reason: Pain of neoplastic origin/CIPN/Met Breast CA/Goals of Care





- Information Sources


Records reviewed: Previous records reviewed


History/Review of Systems obtained from: Patient


Exam limitations: No limitations





- History of Present Illness Update


Brief HPI Update: 


This is a ernesto 61-year-old woman has recurrent stage IV breast cancer with 

mets to the bone and pelvis since 3/2020.  She has been restarted on Doxil, 

continued on letrozole, and is due for restaging scan in late October 2021.  

Patient continues with recurrent ascites secondary to her peritoneal 

carcinomatosis and pelvic mass, is due to have her stents replaced next week 

secondary to hydronephrosis, and continues with large volume paracentesis.  

Unfortunately she also has developed worsening peripheral edema with chronic 

nonhealing ulcerations, needing debridement and dressings.  She sees wound 

clinic weekly, and change his dressing in between.





Patient does have persistent neuropathy, pain rating across her pelvis and spine

as well as her lower scapular area.  She has recently increased her pregabalin 

from 75 Milligrams from twice daily to 3 times daily.  She has not needed to use

her oxycodone though continues to be quite uncomfortable with abdominal 

pressure, ascites, lower extremity edema, and shortness of breath with 

particularly recurrent ascites.





Patient does appear much more cachectic with temporal wasting, upper extremity 

wasting, legs with peripheral edema but does admit to declining functional 

status.  She has been still trying to work, we are here talking about FMLA 

today.  This intertwined's closely with her goals, which continue to be 

difficult for her to define and talk about.  She does understand the seriousness

of her illness.  She does feel like she is starting her physical decline.  She 

has made her first request for DWD medication with her oncologist last week.  

She denies persistent depression or anxiety, and tries to keep a positive 

attitude but is recognizing changes are happening.





Past Medical History: 


History of stage IIIb breast cancer, IDC, bilateral mastectomy with left 

axillary lymph node dissection with adjuvant chemotherapy.  Diabetes type 2 on 

glipizide, hydronephrosis with stent placement, recurrent ascites, hypertension








Social History





- Living Situation


Living arrangement: At home


Living Situation: With spouse/s.o., With family


Support System: 


Patient lives with her ernesto  Stephen, they have been  for over 27 

years, have 2 daughters who are living at home.  Patient is a nurse, has been 

working [employer] but is finding this more burdensome and difficult with her 

declining functional status and fatigue level.








Medications/Allergies





- Medications


Home Medications: 


                                Ambulatory Orders











 Medication  Instructions  Recorded  Confirmed


 


Felodipine [Plendil] 5 mg PO DAILY MDD titrating off 05/07/13 10/06/21


 


Glipizide [Glipizide Xl] 2.5 mg PO DAILY 05/06/20 10/06/21


 


Prochlorperazine Maleate 10 mg PO Q6HR PRN 05/12/20 10/06/21


 


ondansetron HCL [Zofran] 8 mg PO BID PRN 05/12/20 10/06/21


 


Atorvastatin [Lipitor] 10 mg PO DAILY 05/20/20 10/06/21


 


Pregabalin [Lyrica] 75 mg PO TID 03/04/21 10/06/21


 


Letrozole 2.5 mg PO DAILY 04/22/21 10/06/21


 


Oxycodone HCl [Roxicodone] 5 mg PO Q4HR PRN 04/22/21 10/06/21


 


polyethylene glycoL 3350 [Miralax] 17 gm PO DAILY PRN 04/22/21 10/06/21














- Allergies


Allergies/Adverse Reactions: 


                                    Allergies











Allergy/AdvReac Type Severity Reaction Status Date / Time


 


No Known Drug Allergies Allergy   Verified 07/14/21 10:13














Review of Systems





- Constitutional


Constitutional: reports: Fatigue, Weakness, Weight gain (with ascites but 

cachexia)





- Eyes


Eyes: reports: Vision loss





- Ears, Nose & Throat


Ears, Nose & Throat: reports: Dry mouth





- Cardiovascular


Cardiovascular: reports: Edema, Exertional dyspnea, Decr. exercise tolerance





- Respiratory


Respiratory: reports: SOB with exertion.  denies: SOB at rest





- Gastrointestinal


Gastrointestinal: reports: Bloating, Poor appetite, Early satiety.  denies: 

Constipation (occ incontinence of stool), Nausea





- Musculoskeletal


Musculoskeletal: reports: Back pain, Muscle aches, Stiffness, Muscle weakness





- Integumentary


Integumentary: reports: Dryness, Other (wounds on LE from weeping)





- Neurological


Neurological: reports: General weakness, Numbness





- Endocrine


Endocrine: reports: Diabetes type 2 (checking 2x a week with 127 range)





- Hematologic/Lymphatic


Hematologic/Lymph: reports: Anemia (9.5), Recurrent infections (recently treated

 with Cipro for leg cellulitis)





- All Other Systems


All Other Systems: reports: Reviewed and negative





Physical Exam





- Vital Signs


Blood Pressure: 98/66 (wound clinic; has been consistently low at clinics)





- Physical Exam


General Appearance: positive: No acute distress, Alert, Cachetic


Eyes Bilateral: positive: Normal inspection, No scleral icterus


Respiratory: negative: No respiratory distress (with activity)


Abdomen: positive: Non-tender, Soft, Distended, Taut


Skin: positive: Wound (bilaterally LE; dressings intact)


Extremities: positive: Pedal edema (up to thighs)


Neurologic/Psychiatric: positive: Oriented x3, Mood/affect nml, Weakness





Palliative Care





- POLST


Patient has POLST: No


POLST Status: Full Code


Pain: Pain worsening, Location (generalized discomfort with ascites/swelling; 

lower back/pelvic), Severity (3/10)


Tiredness/Fatigue: Mild (1-3)


Drowsiness/Sedation: Mild (1-3)


Nausea: None


Anorexia: Mild (1-3), Weight loss (eating less with ascites/anorexia)


Dyspnea: Mild (1-3)


Depression: None


Anxiety: None


Feelings of wellbeing/Perceived Quality of Life: Excellent, Acceptable, 

Worsening


Sleep: Sleeps well


Constipation: No


Performance Status: 


Patient is reporting increased difficulty with activity tolerance, more symptoms

 in the context of impacting her ability to work, does find her self having more

 difficulty overall with ambulation and discomfort.  She is able to manage her 

ADLs, but is noting significant changes in her body.








- Palliative Care


Discussion: 


Patient is always quite close to her chest about how she is feeling what is 

going on.  She does report she is noticing decline and does feel like things are

 changing.  She does not feel acutely distressed, but did make her first request

 to Dr. Ludwig for death with dignity medications. She does feel like her  

is having more difficulty with this, he was invited to come to the appointment 

but most likely is not going to participate in any kind of counseling or support

 regarding this.  She feels like they do have some honest conversation but does 

feel like this is going to be difficult.  They are starting to make plans, she 

is going to go on FMLA, FMLA paperwork was completed in the context of expecting

 not to return to work.








Results





- Lab Results


Lab results reviewed: Yes





Impression and Recommendations





- Palliative Care


Impression: 


This is a ernesto 61-year-old woman with recurrent stage IV lobular breast 

cancer, with mets to the bone and pelvic mass since 3/2020.  She is currently 

receiving Doxil, and continuing letrozole 2.5 mg daily.  She has had some 

increased pain with her pregabalin now up to 3 times daily, but is having 

significant recurrent ascites with sequela of lower extremity edema and venous 

stasis ulcers.  Palliative care to provide support regarding symptom management 

psychosocial support and anticipatory guidance





Recommendations/Counseling Done: 


1. Pain of neoplastic origin.  Patient presents with multifactorial pain, to 

mostly to bone mets and peripheral neuropathy, as well as abdominal discomfort 

and lower extremity edema.  She does have oxycodone available for escalating pa

in, has not needed to use it up to this point.  Has just recently titrated up to

 the pregabalin to 75 mg 3 times daily, will continue to monitor.





2.  Venous stasis ulcers.  Patient is being followed by wound clinic, with 

weekly appointments, has intermittently been treated for cellulitis.  Continues 

to manage, with goal for minimizing infection risk, hopefully for healing, but 

with persistent lower extremity edema and low protein suspect will continue to 

be challenging.





3.  Recurrent metastatic breast cancer with pelvic and bone mets.  Patient does 

have pending restaging scans, due for stent replacements, continues with Doxil. 

 Is feeling overall some functional decline, increasing symptom burden, and 

aware treatment is palliative only.  Patient ultimate goal is to death at home, 

and considering using DWD medications.  Patient will continue to weigh benefits 

and burdens of treatment.





4.  Advanced care planning.  We will continue to address as patient is 

comfortable.,  Counseling provided regarding DWD process with encouragement to 

call [facility] [place] to get a volunteer.  Did review the process briefly,

 has made first request.  Patient has been encouraged to complete further 

advanced care planning documents, will continue to address this with patient's 

comfort level. Reminded to reach out if needs further support for pain or 

symptom management or advanced care planning, or counseling for support with her

 or her and her .





45 minutes review of chart, labs, oncology notes and face-to-face for counseling

 regarding symptom management, advanced care planning, coordination of care with

 oncology team and completion of FMLA paperwork

## 2021-10-10 ENCOUNTER — HOSPITAL ENCOUNTER (OUTPATIENT)
Dept: HOSPITAL 76 - LAB | Age: 61
Discharge: HOME | End: 2021-10-10
Attending: UROLOGY
Payer: COMMERCIAL

## 2021-10-10 DIAGNOSIS — Z20.822: ICD-10-CM

## 2021-10-10 DIAGNOSIS — Z11.9: Primary | ICD-10-CM

## 2021-10-11 ENCOUNTER — HOSPITAL ENCOUNTER (OUTPATIENT)
Dept: HOSPITAL 76 - DI | Age: 61
Discharge: HOME | End: 2021-10-11
Attending: INTERNAL MEDICINE
Payer: COMMERCIAL

## 2021-10-11 DIAGNOSIS — C78.6: ICD-10-CM

## 2021-10-11 DIAGNOSIS — C79.51: Primary | ICD-10-CM

## 2021-10-11 DIAGNOSIS — R18.0: ICD-10-CM

## 2021-10-11 DIAGNOSIS — D63.1: ICD-10-CM

## 2021-10-11 PROCEDURE — 49083 ABD PARACENTESIS W/IMAGING: CPT

## 2021-10-11 NOTE — ULTRASOUND REPORT
PROCEDURE:  Abdominal Paracentesis

 

INDICATIONS:  MALIGNANT ASCITES

 

TECHNIQUE:  

The indications, alternatives, benefits, risks, and complications of the procedure were explained to 
the patient.  Written informed consent was obtained and placed in the chart.  The abdomen and pelvis 
were examined sonographically, and an appropriate site was chosen for paracentesis.  The skin was pre
pared and draped in the usual sterile fashion, and 1% lidocaine was infiltrated from the skin down th
rough the peritoneal surface.  A 19-gauge catheter-covered needle was then introduced into the perito
jovany space, the catheter was advanced and the needle was withdrawn, and thereafter peritoneal fluid w
as withdrawn.  The catheter was then removed and a dressing was applied.  The fluid was discarded if 
the clinician did not order diagnostic testing of the fluid.  

 

COMPARISON:  9/27/2021, 9/13/2021

 

FINDINGS:  

Access site:  Right lower quadrant.

Needle:  One-Step centesis catheter with introducer needle.  

Fluid volume and description:  5.3 L of clear to slightly yellow-colored fluid.

Fluid sent for diagnostic testing:  Not ordered.

Medications:  1% lidocaine for local anaesthesia.  

Complications:  None.  

 

IMPRESSION:  

1. Successful ultrasound-guided paracentesis.  

 

Reviewed by: Meño Walton MD on 10/11/2021 3:49 PM PDT

Approved by: Meño Walton MD on 10/11/2021 3:49 PM PDT

 

 

Station ID:  [id number]

## 2021-10-18 ENCOUNTER — HOSPITAL ENCOUNTER (OUTPATIENT)
Dept: HOSPITAL 76 - DI | Age: 61
Discharge: HOME | End: 2021-10-18
Attending: INTERNAL MEDICINE
Payer: COMMERCIAL

## 2021-10-18 DIAGNOSIS — C50.919: Primary | ICD-10-CM

## 2021-10-18 DIAGNOSIS — R19.00: ICD-10-CM

## 2021-10-18 DIAGNOSIS — R18.8: ICD-10-CM

## 2021-10-18 DIAGNOSIS — C79.51: ICD-10-CM

## 2021-10-18 DIAGNOSIS — J70.1: ICD-10-CM

## 2021-10-18 NOTE — CT REPORT
PROCEDURE:  Abdomen/Pelvis WO

 

INDICATIONS:  BREAST CA

 

TECHNIQUE:  

Noncontrast 5 mm thick sections acquired from the diaphragms to the symphysis.  5 mm coronal and sagi
ttal reformats were then performed.  For radiation dose reduction, the following was used:  automated
 exposure control, adjustment of mA and/or kV according to patient size.

 

COMPARISON:  7/9/2021

 

FINDINGS:  

Image quality:  Excellent.  

 

ABDOMEN:  

Lung bases:  Lung bases are clear.  Heart size is normal.  

 

Solid organs:  The liver is stable in size and no evidence of mass without IV contrast presents. The 
gallbladder is surgically absent. Spleen size is normal. Coarse calcifications in the expected locati
on of the pancreas. No adrenal masses. Right renal atrophy.

 

Resolution of left hydronephrosis. Bilateral nephroureteral stents are present. Proximal pigtails of 
both stents are curled in the proximal ureters. 

 

Peritoneum and bowel:  Large amount of ascites is present. Stomach appears grossly normal. Bowel loop
s are decompressed. 

 

Nodes and vessels: No new adenopathy visible..  Aorta and inferior vena cava are normal in caliber.  


 

Miscellaneous:  No ventral hernias.  

 

 

PELVIS:  

Genitourinary: The urinary bladder is diffusely decompressed and there is air in the expected bladder
 location. Distal pigtails of bilateral nephroureteral stents are within the posterior inferior aspec
t of the bladder.

 

Uterus is present. There is a multilobulated large pelvic mass roughly stable in size compared to the
 most recent prior study

 

Miscellaneous:  No inguinal hernias or adenopathy.  

 

Bones: Diffuse patchy sclerotic lesions throughout the osseous structures.

 

IMPRESSION:  

1. Similar placement of new bilateral nephroureteral stents with resolution of left hydronephrosis.

2. A stable amount of ascites.

3. Unchanged pelvic mass/masses.

4. Extensive osseous metastatic disease. No new fractures.

5. Air within the urinary bladder is most likely secondary to recent instrumentation.

 

Reviewed by: Janelle Maldonado MD on 10/18/2021 9:52 AM PDT

Approved by: Janelle Maldonado MD on 10/18/2021 9:52 AM PDT

 

 

Station ID:  [id number]

## 2021-10-18 NOTE — CT REPORT
PROCEDURE:  CHEST WO

 

INDICATIONS:  BREAST CANCER

 

TECHNIQUE: 

Noncontrast 1mm axial images were acquired from the pulmonary apices to the posterior costophrenic an
gles.  Axial 5 mm soft tissue kernel reconstructions were performed as well as 8 mm axial MIP and cor
onal and sagittal 5 mm reformations. For radiation dose reduction, the following was used: automate
d exposure control, adjustment of mA and/or kV according to patient size.

 

COMPARISON:  7/9/2021

 

FINDINGS:  

Image quality:  Excellent.  

 

Lungs and pleura:  No acute air space opacities. 4.4 mm calcified granuloma centrally in the left chace
g base. Small focal area of radiation fibrosis in the anterior left upper lobe.  No pleural effusions
 or pneumothorax.  Central and peripheral airways are patent and normal in caliber.  

 

Mediastinum:  Heart size is normal.  No pericardial effusion.  Coarse calcifications in the left db
r region. No mediastinal adenopathy by size criteria.  Thoracic aorta and central pulmonary arteries 
are normal in size.  Esophagus is normal in caliber.  No hiatal hernia.  

 

Bones and chest wall:  Left chest Mediport. Surgical clips in the left axilla. Surgical changes in th
e anterior chest wall bilaterally. Diffuse patchy sclerosis throughout the osseous structures consist
ent with metastatic disease. No axillary or supraclavicular adenopathy by size criteria.  The thyroid
 is normal in size and there are no incidental findings.

 

Abdomen: There is ascites present. Please see separately dictated CT abdomen pelvis report for furthe
r details.

 

IMPRESSION:  

1. No new pulmonary parenchymal lesions.

2. There are changes of radiation fibrosis in the left anterior upper lung.

4. Evidence of remote, healed granulomatous disease in the left lung and hilar region.

4. Diffuse sclerotic metastatic lesions throughout the osseous structures.

 

Reviewed by: Janelle Maldonado MD on 10/18/2021 9:41 AM PDT

Approved by: Janelle Maldonado MD on 10/18/2021 9:41 AM PDT

 

 

Station ID:  [id number]

## 2021-10-25 ENCOUNTER — HOSPITAL ENCOUNTER (OUTPATIENT)
Dept: HOSPITAL 76 - DI | Age: 61
Discharge: HOME | End: 2021-10-25
Attending: INTERNAL MEDICINE
Payer: COMMERCIAL

## 2021-10-25 DIAGNOSIS — R18.0: ICD-10-CM

## 2021-10-25 DIAGNOSIS — D63.1: ICD-10-CM

## 2021-10-25 DIAGNOSIS — C78.6: Primary | ICD-10-CM

## 2021-10-25 DIAGNOSIS — C79.51: ICD-10-CM

## 2021-10-25 PROCEDURE — 49083 ABD PARACENTESIS W/IMAGING: CPT

## 2021-10-26 NOTE — ULTRASOUND REPORT
PROCEDURE:  Abdominal Paracentesis

 

INDICATIONS:  MALIGNANT ASCITES

 

TECHNIQUE:  

The indications, alternatives, benefits, risks, and complications of the procedure were explained to 
the patient.  Written informed consent was obtained and placed in the chart.  The abdomen and pelvis 
were examined sonographically, and an appropriate site was chosen for paracentesis.  The skin was pre
pared and draped in the usual sterile fashion, and 1% lidocaine was infiltrated from the skin down th
rough the peritoneal surface.  A 19-gauge catheter-covered needle was then introduced into the perito
jovany space, the catheter was advanced and the needle was withdrawn, and thereafter peritoneal fluid w
as withdrawn.  The catheter was then removed and a dressing was applied.  The fluid was discarded if 
the clinician did not order diagnostic testing of the fluid.  

 

COMPARISON:  None

 

FINDINGS:  

Access site:  Right lower quadrant

Needle:  One-Step centesis catheter with introducer needle.  

Fluid volume and description:  4600 cc of clear-colored fluid

Fluid sent for diagnostic testing:  Not requested

Medications:  1% lidocaine for local anaesthesia.  

Complications:  None.  

 

IMPRESSION:  Successful ultrasound-guided paracentesis. No immediate complications. 

 

Reviewed by: uSsan Brewer MD, PhD on 10/26/2021 10:13 AM PDT

Approved by: Susan Brewer MD, PhD on 10/26/2021 10:13 AM PDT

 

 

Station ID:  [id number]

## 2021-11-05 ENCOUNTER — HOSPITAL ENCOUNTER (OUTPATIENT)
Dept: HOSPITAL 76 - PC | Age: 61
Discharge: HOME | End: 2021-11-05
Attending: NURSE PRACTITIONER
Payer: COMMERCIAL

## 2021-11-05 DIAGNOSIS — D64.9: ICD-10-CM

## 2021-11-05 DIAGNOSIS — Z66: ICD-10-CM

## 2021-11-05 DIAGNOSIS — R18.8: ICD-10-CM

## 2021-11-05 DIAGNOSIS — G89.3: ICD-10-CM

## 2021-11-05 DIAGNOSIS — R53.83: ICD-10-CM

## 2021-11-05 DIAGNOSIS — E11.40: ICD-10-CM

## 2021-11-05 DIAGNOSIS — I83.009: ICD-10-CM

## 2021-11-05 DIAGNOSIS — C79.51: ICD-10-CM

## 2021-11-05 DIAGNOSIS — L97.909: ICD-10-CM

## 2021-11-05 DIAGNOSIS — C50.919: ICD-10-CM

## 2021-11-05 DIAGNOSIS — I10: ICD-10-CM

## 2021-11-05 DIAGNOSIS — Z51.5: Primary | ICD-10-CM

## 2021-11-05 PROCEDURE — 99215 OFFICE O/P EST HI 40 MIN: CPT

## 2021-11-05 NOTE — CONSULTATION NOTE
Palliative Care Follow Up





- Referral


Referring Provider: Dr. Tani Michelle


Time of Visit: 10:30 45 minutes


Referral setting: [facility]


Referral Reason: Pain of neoplastic origin/Met Breast CA/ACP





- Information Sources


Records reviewed: RN notes reviewed


History/Review of Systems obtained from: Patient


Exam limitations: No limitations





- History of Present Illness Update


Brief HPI Update: 


This is a ernesto 61-year-old woman who has recurrent stage IV breast cancer with

mets to the bones and pelvis since 3/2020.  She was restarted on Doxil and 

continued on letrozole, But with progression of disease and worsening fatigue 

and discomfort with treatment, has chosen to "take a holiday".  The goal had 

been to try and decrease the amount of fluid and frequency of her required 

therapeutic paracentesis, for which they were draining 5 L at a time.  At this 

point time she is decided to and obtained a aspire catheter on Wednesday 11/3 of

this week, at that time they drained 5000 mils, with instruction to drain every 

2 days 1000 mils to better manage this. Unfortunately she is having some fairly 

severe pain on the right side of her abdomen, most likely where the tail end of 

the catheter is.  She is due to drain today, if this does not diminish, she will

contact interventional radiology.  She is wondering if it is pressing against 

her bowel.  Patient does have persistent neuropathy, pain rating across her 

pelvis and spine as well as her lower scapular area.  She is currently taking 

pregabalin 75 mg 2-3 times daily, and THC at bedtime.  She does have oxycodone 

for breakthrough pain if needed.





We are meeting today to talk about advance care directives, patient has met with

end-of-life question and physician Dr. Jr Regan, and obtained a prescription. 

She has been trying to confirm with [facility] that it is in place and further 

information about timing.  Patient verbalized that she is not planning to go 

back on chemotherapy, we discussed that any point then she would be eligible to 

transition to hospice support.  We did discuss recommended at the point that she

was thinking of DWD, or had more decline, would also recommend hospice support.





Patient has continued to lose weight, she is at 111.  She is already 

reaccumulating ascites, she is having increased fatigue, her lower extremity 

edema somewhat improved, though does have lower extremity wounds as result of 

weeping.  She is quite fatigued and this is both physical and emotional in the 

context of her ongoing decline.





Past Medical History: 


History of stage IIIb breast cancer, IDC, bilateral mastectomy with left 

axillary lymph node dissection with adjuvant chemotherapy.  Diabetes type 2 on 

glipizide, hydronephrosis with stent placement, recurrent ascites, hypertension








Social History





- Living Situation


Living arrangement: At home


Living Situation: With spouse/s.o., With family


Support System: 


Patient lives at home with her ernesto  Stephen, they have been  over

27 years.  They do have 2 daughters.  Everybody appropriately is struggling with

her decline, patient's happy place is [place], they have sold their condo, this 

will help as far as financial support for them.  She is started her FMLA and 

"officially retired".  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 

surgery.








Medications/Allergies





- Medications


Home Medications: 


                                Ambulatory Orders











 Medication  Instructions  Recorded  Confirmed


 


Felodipine [Plendil] 5 mg PO PRN PRN MDD b/p > 110/70 05/07/13 10/20/21


 


Glipizide [Glipizide Xl] 5 mg PO DAILY 05/06/20 11/05/21


 


Prochlorperazine Maleate 10 mg PO Q6HR PRN 05/12/20 11/05/21


 


ondansetron HCL [Zofran] 8 mg PO BID PRN 05/12/20 11/05/21


 


Atorvastatin [Lipitor] 10 mg PO DAILY 05/20/20 10/20/21


 


Pregabalin [Lyrica] 75 mg PO TID 03/04/21 11/05/21


 


Letrozole 2.5 mg PO DAILY 04/22/21 11/05/21


 


Oxycodone HCl [Roxicodone] 5 mg PO Q4HR PRN 04/22/21 11/05/21


 


polyethylene glycoL 3350 [Miralax] 17 gm PO DAILY PRN 04/22/21 11/05/21














- Allergies


Allergies/Adverse Reactions: 


                                    Allergies











Allergy/AdvReac Type Severity Reaction Status Date / Time


 


No Known Drug Allergies Allergy   Verified 07/14/21 10:13














Review of Systems





- Constitutional


Constitutional: reports: Fatigue (fatigue worsening), Weakness, Weight gain 

(with ascites but cachexia/111)





- Eyes


Eyes: reports: Vision loss





- Ears, Nose & Throat


Ears, Nose & Throat: reports: Dry mouth





- Cardiovascular


Cardiovascular: reports: Edema, Exertional dyspnea, Decr. exercise tolerance





- Respiratory


Respiratory: reports: Cough, SOB with exertion.  denies: SOB at rest





- Gastrointestinal


Gastrointestinal: reports: Abdominal pain (Right side/concerned related to 

aspire placement), Bloating, Poor appetite, Early satiety.  denies: Constipation

(occ incontinence of stool), Nausea





- Musculoskeletal


Musculoskeletal: reports: Back pain, Muscle aches, Stiffness, Muscle weakness





- Integumentary


Integumentary: reports: Dryness, Other (wounds on LE from weeping)





- Neurological


Neurological: reports: General weakness, Numbness





- Endocrine


Endocrine: reports: Diabetes type 2 (checking 2x a week with 127 range)





- Hematologic/Lymphatic


Hematologic/Lymph: reports: Anemia (9.5), Recurrent infections (recently treated

with Cipro for leg cellulitis)





- All Other Systems


All Other Systems: reports: Reviewed and negative





Physical Exam





- Physical Exam


General Appearance: positive: No acute distress, Alert, Cachetic


Eyes Bilateral: positive: Normal inspection, No scleral icterus


ENT: positive: No signs of dehydration


Neck: positive: Trachea midline


Respiratory: negative: No respiratory distress (with activity)


Abdomen: positive: Non-tender, Soft, Distended, Taut


Skin: positive: Wound (bilaterally LE; dressings intact)


Extremities: positive: Pedal edema (up to thighs)


Neurologic/Psychiatric: positive: Oriented x3, Mood/affect nml, Weakness





Palliative Care





- POLST


Patient has POLST: Yes


POLST Status: DNR, Comfort Measures (completed today)


Pain: Pain worsening, Location (right sided abdominal pain), Severity (severe), 

Comment (baseline 3/10)


Tiredness/Fatigue: Mild (1-3)


Drowsiness/Sedation: None


Nausea: None


Anorexia: None


Dyspnea: Mild (1-3)


Depression: None


Anxiety: None


Feelings of wellbeing/Perceived Quality of Life: Fair, Worsening


Sleep: Sleeps well


Performance Status: 


Patient is independent, and ambulatory able to manage her own ADLs.  But is fin

ding activity tolerance more difficult with increasing fatigue.








- Palliative Care


Discussion: 


Met with patient per her request to talk about advance care planning.  We did 

discuss in the context with no further treatment, she would be eligible for 

hospice.  She would like to talk to her  regarding this, encouraged him 

to reach out and call me with any questions.  Patient is still seeing Dr. michelle 

and getting blood work, but is not planning on further treatment though she has 

had an aspire placed and currently is having some complications with this.  We 

did discuss though in the context of future, particularly regarding patient's 

plan to take DWD meds will need hospice support particularly in follow-up for 

her family.





Patient is quite clear at this point in time she is comfort focused care, would 

not accept any intervention that is not able to be managed here it would be, 

would not have surgery if for obstruction or dialysis for further renal 

complications.  She does express both emotional and physical fatigue with her 

declining status.  She has completed her steps for DWD, but has not been able to

make contact with [facility] to confirm prescription.  She has retired from 

work, they did a ernesto attribute for her and is appropriately grieving move 

along.





Introduced conversation regarding family support, hospice with counseling and 

chaplaincy, as well as 24/7 availability.  Did discuss considering [facility] work 

for daughters, will consider.  Did provide 5 wishes for talking about her 

end-of-life wishes, things that are important to her, particularly around last 

days and memorial planning.








Results





- Lab Results


Lab results reviewed: Yes





Impression and Recommendations





- Palliative Care


Impression: 


This is a ernesto 61-year-old woman with recurrent stage IV lobular breast cancer

with mets to the bone and pelvic mass since 3/2020.  She is currently on a 

"break", with recent placement of aspire catheter for her management of ascites,

continuing letrozole 2.5 mg daily.  Patient continues to decline functionally, 

is expressing emotional fatigue, has completed her work for DWD.  Palliative 

care continue provide support for advanced care planning, and anticipatory 

guidance, and symptom management.





Recommendations/Counseling Done: 


1. Acute on chronic pain.  Patient has multifactorial pain, most of is related 

to her bone mets and peripheral neuropathy, is continue pregabalin 75 mg 3 times

daily.  She presents today with acute pain in her right quadrant, this is after 

placement of her Pleurx catheter, suspect related to this.  Encouraged her to 

use the oxycodone particularly prior to draining, if does not resolve over the 

next 24 to 48 hours recommended she follow-up with IR back at [place].  This 

is not the norm.





2.  Venous stasis ulcer.  Patient is having some decrease in swelling, is hoping

with less ascites, will continue to improve.  Is being managed by wound care.








3.  Recurrent metastatic breast cancer with pelvic and bone mets.  She is not 

planning to return to treatment, is going to manage her ascites with her aspire 

catheter, and ultimate goal is to have a death at home with DWD medications.  

She has completed these steps.





4.  Advanced care planning.  Patient requested meeting regarding advance care 

planning documents, did complete POLST with goals to focus on comfort spending 

time with friends and family and end-of-life a comfortable respectful death at 

home.  She is a DNR/DNI and comfort focused treatment.  Introduced patient 

currently would qualify for hospice if no longer considering treatment, also can

be introduced at a later date.  Counseling provided regarding hospice services, 

reviewed DWD, will reach out and confirm medications available and timeline for 

accessing.





45 minutes review of chart, labs, scans, meeting with patient face-to-face with 

counseling regarding advance care planning, and anticipatory guidance

## 2021-12-02 ENCOUNTER — HOSPITAL ENCOUNTER (OUTPATIENT)
Dept: HOSPITAL 76 - PC | Age: 61
Discharge: HOME | End: 2021-12-02
Attending: NURSE PRACTITIONER
Payer: COMMERCIAL

## 2021-12-02 DIAGNOSIS — C50.919: ICD-10-CM

## 2021-12-02 DIAGNOSIS — G89.3: ICD-10-CM

## 2021-12-02 DIAGNOSIS — R18.8: ICD-10-CM

## 2021-12-02 DIAGNOSIS — C79.51: ICD-10-CM

## 2021-12-02 DIAGNOSIS — Z66: ICD-10-CM

## 2021-12-02 DIAGNOSIS — Z51.5: Primary | ICD-10-CM

## 2021-12-02 DIAGNOSIS — E11.9: ICD-10-CM

## 2021-12-02 DIAGNOSIS — I87.2: ICD-10-CM

## 2021-12-02 DIAGNOSIS — R53.83: ICD-10-CM

## 2021-12-02 DIAGNOSIS — Z79.84: ICD-10-CM

## 2021-12-02 PROCEDURE — 99215 OFFICE O/P EST HI 40 MIN: CPT

## 2021-12-02 NOTE — CONSULTATION NOTE
Palliative Care Follow Up





- Referral


Referring Provider: Dr. Tani Ludwig


Time of Visit: 11:00 45 minutes


Referral setting: MAC


Referral Reason: Pain of neoplastic origin/Met Breast CA/ACP





- Information Sources


Records reviewed: Previous records reviewed


History/Review of Systems obtained from: Patient


Exam limitations: No limitations





- History of Present Illness Update


Brief HPI Update: 


This is a ernesto 61-year-old woman has recurrent stage IV breast cancer with 

mets to the bones and pelvis since 3/2020.  She was restarted on Doxil and had 

continued on letrozole, but with progression of disease and worsening fatigue 

and discomfort with treatment she has chosen to discontinue.  She has since had 

a aspire catheter placed, as she been having significant ascites, requiring a 

paracentesis every 1 to 2 weeks with drainage of anywhere from 4 to 6 L.  With 

the new drain, she is able to every 2 days, drained about 1000-12 100 mils with 

good relief.  This is also brought relief for her lower extremity edema, her 

lower extremity weeping wounds have healed the skin is quite fragile but healed.

 No signs or symptoms of infection but some telangiectasis is present.  She has 

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  This is 

been a relief for her.





We are meeting today in follow-up, patient is choosing no further treatment.  

She does not want any further testing nor further interventions.  She is ready 

to transition to hospice.  She was hoping her  was going to join us, but 

it has been difficult as far as making that huge sleep.  She is having increased

pain, she has been very opioid adverse, and has been controlled on her 

pregabalin 75 mg a.m. and 275 mg at p.m., but is waking in pain 3 to 4 hours 

after her nighttime dosing.  She is having increased discomfort, and identifies 

her pain today at a 3 out of 10.  She does have pain mostly in her mid to lower 

back.  She does have noted bony mets there.  She is continue to lose weight, 

today she is 104, she is eating well, though does appear quite thin.  She denies

any nausea, has had alternating constipation and diarrhea.





Patient has completed her DWD arrangements and has obtained a prescription, it 

is sitting at [facility].  She is appropriately tearful in considering this 

transition, but we are both in agreement most likely will benefit early 

relationship with hospice her family overall.  She does have progressive and 

persistent fatigue but this has improved off of treatment.  She has had her 

ureteral stents replaced recently, reports she has decreased urine output, 

though we did review most likely some of this is her more frequent fluid shifts 

with draining every 2 days.





Past Medical History: 


History of stage III breast cancer, IDC, bilateral mastectomy with left axillary

lymph node dissection with adjuvant chemotherapy.  Diabetes type 2 on glipizide,

hydronephrosis with stent placement, recurrent ascites, hypertension








Social History





- Living Situation


Living arrangement: At home


Living Situation: With spouse/s.o., With family


Support System: 


Patient lives at home with her ernesto  Stephen, they have been  over

27 years.  She was able to spend time in [place] which is her happy place, they 

have finally sold her condo.  This is helped as far as financial support for 

them.  She has started her FMLA and is "officially retired".  They had her two 

daughters are living with them currently, they both work here at [employer] 

as well as well as her  who works in central supply.








Medications/Allergies





- Medications


Home Medications: 


                                Ambulatory Orders











 Medication  Instructions  Recorded  Confirmed


 


Felodipine [Plendil] 5 mg PO .Q 2DAYS 05/07/13 12/02/21


 


Glipizide [Glipizide Xl] 2.5 - 5 mg PO DAILY 05/06/20 12/02/21


 


Prochlorperazine Maleate 10 mg PO Q6HR PRN 05/12/20 12/02/21


 


ondansetron HCL [Zofran] 8 mg PO BID PRN 05/12/20 12/02/21


 


Pregabalin [Lyrica] 75 mg PO TID 03/04/21 12/02/21


 


Oxycodone HCl [Roxicodone] 5 mg PO Q4HR PRN 04/22/21 12/02/21


 


Loperamide [Imodium] 2 mg PO PRN PRN 12/02/21 12/02/21


 


dexAMETHasone [Decadron] 2 mg PO DAILY 12/02/21 12/02/21


 


polyethylene glycoL 3350 [Miralax] 17 gm PO DAILY PRN 12/02/21 12/02/21














- Allergies


Allergies/Adverse Reactions: 


                                    Allergies











Allergy/AdvReac Type Severity Reaction Status Date / Time


 


No Known Drug Allergies Allergy   Verified 07/14/21 10:13














Review of Systems





- Constitutional


Constitutional: reports: Fatigue (remains fatigued but better off chemotherapy),

 Weakness, Weight loss (104)





- Eyes


Eyes: reports: Vision loss





- Ears, Nose & Throat


Ears, Nose & Throat: reports: Dry mouth





- Cardiovascular


Cardiovascular: reports: Exertional dyspnea, Decr. exercise tolerance.  denies: 

Edema





- Respiratory


Respiratory: reports: Cough (improved), SOB with exertion.  denies: SOB at rest





- Gastrointestinal


Gastrointestinal: reports: Abdominal pain (right side today where tube at 

internally), Constipation, Diarrhea, Early satiety.  denies: Nausea





- Genitourinary


Genitourinary: reports: Other (notes decreased output)





- Musculoskeletal


Musculoskeletal: reports: Back pain, Muscle aches, Stiffness, Muscle weakness





- Integumentary


Integumentary: reports: Dryness, Nail changes





- Neurological


Neurological: reports: General weakness, Numbness (residual peripheral 

neuropathy)





- Psychiatric


Psychiatric: reports: Anxiety





- Endocrine


Endocrine: reports: Diabetes type 2 (checking 2x a week with 127 range)





- All Other Systems


All Other Systems: reports: Reviewed and negative





Physical Exam





- Vital Signs


Pulse Rate: 83


Respiratory Rate: 16


Blood Pressure: 109/70





- Physical Exam


General Appearance: positive: No acute distress, Alert, Cachetic


Eyes Bilateral: positive: Normal inspection, No scleral icterus


ENT: positive: No signs of dehydration


Neck: positive: Trachea midline


Respiratory: negative: No respiratory distress (with activity)


Abdomen: positive: Non-tender, Soft, Distended (mild)


Skin: negative: Wound (wounds healed)


Extremities: positive: No pedal edema


Neurologic/Psychiatric: positive: Oriented x3, Mood/affect nml, Weakness





Palliative Care





- POLST


Patient has POLST: Yes


POLST Status: DNR, Comfort Measures


Pain: Pain worsening, Location (mid thoracic/lower back area), Severity (3/10), 

Pattern (pain worse at night laying down)


Tiredness/Fatigue: Mild (1-3)


Drowsiness/Sedation: None


Nausea: None


Anorexia: None


Dyspnea: Mild (1-3)


Depression: None


Anxiety: None


Feelings of wellbeing/Perceived Quality of Life: Excellent, Acceptable, Improved


Sleep: Sleeps poorly (related to pain)


Constipation: Yes, Intermittent constipation (alternating with constipation usi

ng immodium/miralax)


Performance Status: 


Patient is ambulatory, does get quite short of breath with any activity.  She 

does have decreased activity tolerance but is feeling better overall since 

stopping chemo.  Most likely her hemoglobin is improving as far as her fatigue. 

 She is independent in her ADLs and is currently still driving.








- Palliative Care


Discussion: 


We had met last time to finish her advance care planning, had completed a POLST 

with [facility] and comfort measures.  Patient has completed her DWD process, and has

 some awaiting [facility].  We did discuss where she is currently, is feeling like 

it is time to transition to hospice to have services available, does not have 

any other oncology needs or is planning further testing.  She is having more 

pain, and would be helpful to have hospice support to oversee patient's pain 

management.  Patient has not used any opioids at this point is opioid chiquita, she

 has goals that would like to stay more clear and less mottled, we discussed 

methadone most likely better option but would need hospice support to make that 

change.  We did discuss the difficulty for her family and excepting "hospice", 

but also the support they might provide that will be of help. Will arrange for 

hospice admission nurse to coordinate with patient and  so he can join 

for initial visit.








Impression and Recommendations





- Palliative Care


Impression: 


This is a ernesto 61-year-old woman with recurrent stage IV lobular breast cancer

 with mets to the bone and pelvic mass since 3/2020.  She is not planning to 

restart any further treatment, goal is for comfort focused care.  Agreed this is

 a good transition point for transition to hospice, particularly since she is 

having escalating pain and could use support regarding this.  Palliative care 

will coordinate with hospice team for transition to hospice referral and admissi

on





Recommendations/Counseling Done: 


1. Pain of neoplastic origin.  Patient has multifactorial pain, related to bone 

mets and chemotherapy-induced peripheral neuropathy.  She had changed her 

pregabalin to 1 in the morning and 2 at night but still awakening in pain.  She 

has had reluctance to start any oxycodone, she has had this for quite a while.  

Encouraged her to at least take at bedtime, spread back her pregabalin to 3 

times daily, and take an oxycodone pregabalin at bedtime to see if she can get 

better sleep.  Education provided that only last 3 or 4 hours, but to be able to

 transition to long-acting or methadone need her to be a little more aggressive 

or at least trial the oxycodone for response.  Verbalized understanding. Given 

her underlying etiology includes bone mets, will initiate a low dose secondary 

to her diabetes dexamethasone 2 mg.  She was going to decrease her glipizide to 

half tab, if blood sugar is trending up, will go back to full tab.  





2.  Venous stasis ulcers.  Patient's swelling has resolved with resolution of 

ascites.  Wounds are healed the skin very fragile.  No further pain or 

discomfort no signs or symptoms of infection.





3.  Ascites.  This is currently managed with her Pleurx catheter, she is 

draining every 2 days about 1000 1200 mils.  We did discuss could push this to 

every 3 days if she wanted to try, particularly given light of her increased 

right pain feels like pulling in there today.  She will continue to work with 

her drainage, she is independent in this.  She would like to use a hot tub, 

discussed would need to its almost sealed to cover this aggressively.  We did 

discuss aqua guard as well as monitoring for any kind of water leakage.  She 

feels that this does help her back if she is able to do this.  She is ordering 

supplies tomorrow.  Reviewed hospice and takes over supply ordering, so helpful 

to have some lead time.





4.  Recurrent metastatic breast cancer with pelvic and bone mets.  She is not 

planning to return to treatment, she is resigned to no further testing, and 

aware of comfort focused care hospice.  Coordination of care with oncology team 

for transition to hospice.





5.  Advanced care planning.  Patient's POLST is completed with goals to focus on

 comfort.  She would like to have a death at home.  Will refer to hospice, does 

have DWD set up.  Counseling provided and reassurance given regarding hospice 

services.





45 minutes with greater than 50% of this done in counseling and anticipatory 

guidance for transition to hospice, pain and symptom management and psychosocial

 support